# Patient Record
Sex: MALE | Race: WHITE | NOT HISPANIC OR LATINO | Employment: OTHER | URBAN - METROPOLITAN AREA
[De-identification: names, ages, dates, MRNs, and addresses within clinical notes are randomized per-mention and may not be internally consistent; named-entity substitution may affect disease eponyms.]

---

## 2017-03-28 RX ORDER — AMLODIPINE BESYLATE 10 MG/1
TABLET ORAL
Qty: 30 TABLET | Refills: 3 | OUTPATIENT
Start: 2017-03-28

## 2017-03-28 NOTE — TELEPHONE ENCOUNTER
Patient has no showed his last two appts.  Latanya gave pt appt, sent this rx in to last until and scheduled pt an appt for 3/27/17, in which pt no showed.

## 2017-04-24 RX ORDER — AMLODIPINE BESYLATE 10 MG/1
TABLET ORAL
Qty: 30 TABLET | Refills: 3 | OUTPATIENT
Start: 2017-04-24

## 2018-04-17 ENCOUNTER — LAB REQUISITION (OUTPATIENT)
Dept: LAB | Facility: HOSPITAL | Age: 63
End: 2018-04-17

## 2018-04-17 DIAGNOSIS — G56.01 CARPAL TUNNEL SYNDROME OF RIGHT WRIST: ICD-10-CM

## 2018-04-17 LAB — POTASSIUM BLDA-SCNC: 4.35 MMOL/L (ref 3.5–5.3)

## 2018-04-17 PROCEDURE — 84132 ASSAY OF SERUM POTASSIUM: CPT | Performed by: PLASTIC SURGERY

## 2018-06-04 ENCOUNTER — HOSPITAL ENCOUNTER (INPATIENT)
Facility: HOSPITAL | Age: 63
LOS: 1 days | Discharge: HOME | End: 2018-06-07
Attending: FAMILY MEDICINE | Admitting: HOSPITALIST

## 2018-06-04 DIAGNOSIS — R07.89 OTHER CHEST PAIN: ICD-10-CM

## 2018-06-04 DIAGNOSIS — R07.9 CHEST PAIN, UNSPECIFIED TYPE: Primary | ICD-10-CM

## 2018-06-04 PROCEDURE — 99285 EMERGENCY DEPT VISIT HI MDM: CPT

## 2018-06-04 PROCEDURE — 93010 ELECTROCARDIOGRAM REPORT: CPT | Performed by: INTERNAL MEDICINE

## 2018-06-04 PROCEDURE — 93005 ELECTROCARDIOGRAM TRACING: CPT | Performed by: FAMILY MEDICINE

## 2018-06-05 ENCOUNTER — APPOINTMENT (OUTPATIENT)
Dept: NUCLEAR MEDICINE | Facility: HOSPITAL | Age: 63
End: 2018-06-05

## 2018-06-05 ENCOUNTER — APPOINTMENT (OUTPATIENT)
Dept: CARDIOLOGY | Facility: HOSPITAL | Age: 63
End: 2018-06-05
Attending: INTERNAL MEDICINE

## 2018-06-05 ENCOUNTER — EPISODE CHANGES (OUTPATIENT)
Dept: CASE MANAGEMENT | Facility: OTHER | Age: 63
End: 2018-06-05

## 2018-06-05 ENCOUNTER — APPOINTMENT (OUTPATIENT)
Dept: GENERAL RADIOLOGY | Facility: HOSPITAL | Age: 63
End: 2018-06-05

## 2018-06-05 ENCOUNTER — APPOINTMENT (OUTPATIENT)
Dept: CT IMAGING | Facility: HOSPITAL | Age: 63
End: 2018-06-05

## 2018-06-05 ENCOUNTER — APPOINTMENT (OUTPATIENT)
Dept: CARDIOLOGY | Facility: HOSPITAL | Age: 63
End: 2018-06-05

## 2018-06-05 PROBLEM — R07.9 CHEST PAIN: Status: ACTIVE | Noted: 2018-06-05

## 2018-06-05 LAB
ALBUMIN SERPL-MCNC: 4.3 G/DL (ref 3.4–4.8)
ALBUMIN/GLOB SERPL: 1.6 G/DL (ref 1.5–2.5)
ALP SERPL-CCNC: 73 U/L (ref 40–129)
ALT SERPL W P-5'-P-CCNC: 29 U/L (ref 10–44)
ANION GAP SERPL CALCULATED.3IONS-SCNC: 8.1 MMOL/L (ref 3.6–11.2)
AST SERPL-CCNC: 31 U/L (ref 10–34)
BASOPHILS # BLD AUTO: 0.02 10*3/MM3 (ref 0–0.3)
BASOPHILS NFR BLD AUTO: 0.3 % (ref 0–2)
BH CV ECHO MEAS - % IVS THICK: 7.4 %
BH CV ECHO MEAS - % LVPW THICK: 37.1 %
BH CV ECHO MEAS - ACS: 2.3 CM
BH CV ECHO MEAS - AO MAX PG: 13.8 MMHG
BH CV ECHO MEAS - AO MEAN PG: 7.2 MMHG
BH CV ECHO MEAS - AO ROOT AREA (BSA CORRECTED): 1.5
BH CV ECHO MEAS - AO ROOT AREA: 10.5 CM^2
BH CV ECHO MEAS - AO ROOT DIAM: 3.6 CM
BH CV ECHO MEAS - AO V2 MAX: 185.5 CM/SEC
BH CV ECHO MEAS - AO V2 MEAN: 122.9 CM/SEC
BH CV ECHO MEAS - AO V2 VTI: 39.8 CM
BH CV ECHO MEAS - BSA(HAYCOCK): 2.6 M^2
BH CV ECHO MEAS - BSA: 2.5 M^2
BH CV ECHO MEAS - BZI_BMI: 35.3 KILOGRAMS/M^2
BH CV ECHO MEAS - BZI_METRIC_HEIGHT: 188 CM
BH CV ECHO MEAS - BZI_METRIC_WEIGHT: 124.7 KG
BH CV ECHO MEAS - CONTRAST EF 4CH: 51.9 ML/M^2
BH CV ECHO MEAS - EDV(CUBED): 211.2 ML
BH CV ECHO MEAS - EDV(MOD-SP4): 106 ML
BH CV ECHO MEAS - EDV(TEICH): 177 ML
BH CV ECHO MEAS - EF(CUBED): 58 %
BH CV ECHO MEAS - EF(MOD-SP4): 51.9 %
BH CV ECHO MEAS - EF(TEICH): 48.8 %
BH CV ECHO MEAS - ESV(CUBED): 88.7 ML
BH CV ECHO MEAS - ESV(MOD-SP4): 51 ML
BH CV ECHO MEAS - ESV(TEICH): 90.5 ML
BH CV ECHO MEAS - FS: 25.1 %
BH CV ECHO MEAS - IVS/LVPW: 1.2
BH CV ECHO MEAS - IVSD: 1.7 CM
BH CV ECHO MEAS - IVSS: 1.8 CM
BH CV ECHO MEAS - LA DIMENSION: 2.9 CM
BH CV ECHO MEAS - LA/AO: 0.79
BH CV ECHO MEAS - LV DIASTOLIC VOL/BSA (35-75): 42.6 ML/M^2
BH CV ECHO MEAS - LV MASS(C)D: 440.1 GRAMS
BH CV ECHO MEAS - LV MASS(C)DI: 176.9 GRAMS/M^2
BH CV ECHO MEAS - LV MASS(C)S: 384 GRAMS
BH CV ECHO MEAS - LV MASS(C)SI: 154.3 GRAMS/M^2
BH CV ECHO MEAS - LV SYSTOLIC VOL/BSA (12-30): 20.5 ML/M^2
BH CV ECHO MEAS - LVIDD: 6 CM
BH CV ECHO MEAS - LVIDS: 4.5 CM
BH CV ECHO MEAS - LVLD AP4: 8.5 CM
BH CV ECHO MEAS - LVLS AP4: 7.7 CM
BH CV ECHO MEAS - LVOT AREA (M): 6.6 CM^2
BH CV ECHO MEAS - LVOT AREA: 6.6 CM^2
BH CV ECHO MEAS - LVOT DIAM: 2.9 CM
BH CV ECHO MEAS - LVPWD: 1.4 CM
BH CV ECHO MEAS - LVPWS: 1.9 CM
BH CV ECHO MEAS - MV A MAX VEL: 89.8 CM/SEC
BH CV ECHO MEAS - MV E MAX VEL: 99.7 CM/SEC
BH CV ECHO MEAS - MV E/A: 1.1
BH CV ECHO MEAS - PA ACC SLOPE: 1131 CM/SEC^2
BH CV ECHO MEAS - PA ACC TIME: 0.11 SEC
BH CV ECHO MEAS - PA PR(ACCEL): 31.5 MMHG
BH CV ECHO MEAS - SI(AO): 167.4 ML/M^2
BH CV ECHO MEAS - SI(CUBED): 49.2 ML/M^2
BH CV ECHO MEAS - SI(MOD-SP4): 22.1 ML/M^2
BH CV ECHO MEAS - SI(TEICH): 34.7 ML/M^2
BH CV ECHO MEAS - SV(AO): 416.4 ML
BH CV ECHO MEAS - SV(CUBED): 122.5 ML
BH CV ECHO MEAS - SV(MOD-SP4): 55 ML
BH CV ECHO MEAS - SV(TEICH): 86.4 ML
BH CV NUCLEAR PRIOR STUDY: 3
BH CV STRESS BP STAGE 1: NORMAL
BH CV STRESS BP STAGE 2: NORMAL
BH CV STRESS COMMENTS STAGE 1: NORMAL
BH CV STRESS COMMENTS STAGE 2: NORMAL
BH CV STRESS DOSE REGADENOSON STAGE 1: 0.4
BH CV STRESS DURATION MIN STAGE 1: 0
BH CV STRESS DURATION MIN STAGE 2: 4
BH CV STRESS DURATION SEC STAGE 1: 10
BH CV STRESS DURATION SEC STAGE 2: 0
BH CV STRESS HR STAGE 1: 88
BH CV STRESS HR STAGE 2: 73
BH CV STRESS PROTOCOL 1: NORMAL
BH CV STRESS RECOVERY BP: NORMAL MMHG
BH CV STRESS RECOVERY HR: 73 BPM
BH CV STRESS STAGE 1: 1
BH CV STRESS STAGE 2: 2
BILIRUB SERPL-MCNC: 0.7 MG/DL (ref 0.2–1.8)
BILIRUB UR QL STRIP: NEGATIVE
BUN BLD-MCNC: 18 MG/DL (ref 7–21)
BUN/CREAT SERPL: 20.5 (ref 7–25)
CALCIUM SPEC-SCNC: 10.1 MG/DL (ref 7.7–10)
CHLORIDE SERPL-SCNC: 104 MMOL/L (ref 99–112)
CHOLEST SERPL-MCNC: 148 MG/DL (ref 0–200)
CK MB SERPL-CCNC: 2.19 NG/ML (ref 0–5)
CK MB SERPL-CCNC: 2.59 NG/ML (ref 0–5)
CK MB SERPL-CCNC: 3.12 NG/ML (ref 0–5)
CK MB SERPL-CCNC: 3.3 NG/ML (ref 0–5)
CK MB SERPL-RTO: 2 % (ref 0–3)
CK MB SERPL-RTO: 2.2 % (ref 0–3)
CK SERPL-CCNC: 115 U/L (ref 24–204)
CK SERPL-CCNC: 139 U/L (ref 24–204)
CK SERPL-CCNC: 163 U/L (ref 24–204)
CLARITY UR: CLEAR
CO2 SERPL-SCNC: 25.9 MMOL/L (ref 24.3–31.9)
COLOR UR: ABNORMAL
CREAT BLD-MCNC: 0.88 MG/DL (ref 0.43–1.29)
D DIMER PPP FEU-MCNC: <0.27 MCGFEU/ML (ref 0–0.5)
DEPRECATED RDW RBC AUTO: 48.4 FL (ref 37–54)
EOSINOPHIL # BLD AUTO: 0.09 10*3/MM3 (ref 0–0.7)
EOSINOPHIL NFR BLD AUTO: 1.3 % (ref 0–7)
ERYTHROCYTE [DISTWIDTH] IN BLOOD BY AUTOMATED COUNT: 13.9 % (ref 11.5–14.5)
FOLATE SERPL-MCNC: 13.07 NG/ML (ref 5.4–20)
GFR SERPL CREATININE-BSD FRML MDRD: 87 ML/MIN/1.73
GLOBULIN UR ELPH-MCNC: 2.7 GM/DL
GLUCOSE BLD-MCNC: 203 MG/DL (ref 70–110)
GLUCOSE BLDC GLUCOMTR-MCNC: 143 MG/DL (ref 70–130)
GLUCOSE BLDC GLUCOMTR-MCNC: 148 MG/DL (ref 70–130)
GLUCOSE BLDC GLUCOMTR-MCNC: 187 MG/DL (ref 70–130)
GLUCOSE UR STRIP-MCNC: ABNORMAL MG/DL
HBA1C MFR BLD: 8.4 % (ref 4.5–5.7)
HCT VFR BLD AUTO: 44.9 % (ref 42–52)
HDLC SERPL-MCNC: 28 MG/DL (ref 60–100)
HGB BLD-MCNC: 15.5 G/DL (ref 14–18)
HGB UR QL STRIP.AUTO: NEGATIVE
IMM GRANULOCYTES # BLD: 0.02 10*3/MM3 (ref 0–0.03)
IMM GRANULOCYTES NFR BLD: 0.3 % (ref 0–0.5)
KETONES UR QL STRIP: ABNORMAL
LDLC SERPL CALC-MCNC: ABNORMAL MG/DL (ref 0–100)
LDLC/HDLC SERPL: ABNORMAL {RATIO}
LEUKOCYTE ESTERASE UR QL STRIP.AUTO: NEGATIVE
LIPASE SERPL-CCNC: 44 U/L (ref 13–60)
LV EF NUC BP: 50 %
LYMPHOCYTES # BLD AUTO: 3.02 10*3/MM3 (ref 1–3)
LYMPHOCYTES NFR BLD AUTO: 41.9 % (ref 16–46)
MAXIMAL PREDICTED HEART RATE: 155 BPM
MAXIMAL PREDICTED HEART RATE: 155 BPM
MCH RBC QN AUTO: 33.7 PG (ref 27–33)
MCHC RBC AUTO-ENTMCNC: 34.5 G/DL (ref 33–37)
MCV RBC AUTO: 97.6 FL (ref 80–94)
MONOCYTES # BLD AUTO: 0.73 10*3/MM3 (ref 0.1–0.9)
MONOCYTES NFR BLD AUTO: 10.1 % (ref 0–12)
MYOGLOBIN SERPL-MCNC: 64 NG/ML (ref 0–109)
MYOGLOBIN SERPL-MCNC: 71 NG/ML (ref 0–109)
MYOGLOBIN SERPL-MCNC: 75 NG/ML (ref 0–109)
NEUTROPHILS # BLD AUTO: 3.32 10*3/MM3 (ref 1.4–6.5)
NEUTROPHILS NFR BLD AUTO: 46.1 % (ref 40–75)
NITRITE UR QL STRIP: NEGATIVE
OSMOLALITY SERPL CALC.SUM OF ELEC: 283.4 MOSM/KG (ref 273–305)
PERCENT MAX PREDICTED HR: 56.77 %
PH UR STRIP.AUTO: 5.5 [PH] (ref 5–8)
PLATELET # BLD AUTO: 112 10*3/MM3 (ref 130–400)
PMV BLD AUTO: 11.5 FL (ref 6–10)
POTASSIUM BLD-SCNC: 4 MMOL/L (ref 3.5–5.3)
PROT SERPL-MCNC: 7 G/DL (ref 6–8)
PROT UR QL STRIP: NEGATIVE
RBC # BLD AUTO: 4.6 10*6/MM3 (ref 4.7–6.1)
SODIUM BLD-SCNC: 138 MMOL/L (ref 135–153)
SP GR UR STRIP: 1.03 (ref 1–1.03)
STRESS BASELINE BP: NORMAL MMHG
STRESS BASELINE HR: 64 BPM
STRESS PERCENT HR: 67 %
STRESS POST PEAK BP: NORMAL MMHG
STRESS POST PEAK HR: 88 BPM
STRESS TARGET HR: 132 BPM
STRESS TARGET HR: 132 BPM
TRIGL SERPL-MCNC: 432 MG/DL (ref 0–150)
TROPONIN I SERPL-MCNC: 0.05 NG/ML
TROPONIN I SERPL-MCNC: 0.06 NG/ML
TROPONIN I SERPL-MCNC: 0.07 NG/ML
TROPONIN I SERPL-MCNC: 0.08 NG/ML
TROPONIN I SERPL-MCNC: 0.08 NG/ML
TSH SERPL DL<=0.05 MIU/L-ACNC: 0.74 MIU/ML (ref 0.55–4.78)
UROBILINOGEN UR QL STRIP: ABNORMAL
VIT B12 BLD-MCNC: 447 PG/ML (ref 211–911)
VLDLC SERPL-MCNC: ABNORMAL MG/DL
WBC NRBC COR # BLD: 7.2 10*3/MM3 (ref 4.5–12.5)

## 2018-06-05 PROCEDURE — 93017 CV STRESS TEST TRACING ONLY: CPT

## 2018-06-05 PROCEDURE — 83036 HEMOGLOBIN GLYCOSYLATED A1C: CPT | Performed by: PHYSICIAN ASSISTANT

## 2018-06-05 PROCEDURE — 74176 CT ABD & PELVIS W/O CONTRAST: CPT | Performed by: RADIOLOGY

## 2018-06-05 PROCEDURE — 78452 HT MUSCLE IMAGE SPECT MULT: CPT | Performed by: INTERNAL MEDICINE

## 2018-06-05 PROCEDURE — 0 TECHNETIUM SESTAMIBI: Performed by: HOSPITALIST

## 2018-06-05 PROCEDURE — 93306 TTE W/DOPPLER COMPLETE: CPT | Performed by: INTERNAL MEDICINE

## 2018-06-05 PROCEDURE — 93306 TTE W/DOPPLER COMPLETE: CPT

## 2018-06-05 PROCEDURE — 84484 ASSAY OF TROPONIN QUANT: CPT | Performed by: PHYSICIAN ASSISTANT

## 2018-06-05 PROCEDURE — 84443 ASSAY THYROID STIM HORMONE: CPT | Performed by: PHYSICIAN ASSISTANT

## 2018-06-05 PROCEDURE — 93005 ELECTROCARDIOGRAM TRACING: CPT | Performed by: PHYSICIAN ASSISTANT

## 2018-06-05 PROCEDURE — G0378 HOSPITAL OBSERVATION PER HR: HCPCS

## 2018-06-05 PROCEDURE — 81003 URINALYSIS AUTO W/O SCOPE: CPT | Performed by: PHYSICIAN ASSISTANT

## 2018-06-05 PROCEDURE — 82746 ASSAY OF FOLIC ACID SERUM: CPT | Performed by: PHYSICIAN ASSISTANT

## 2018-06-05 PROCEDURE — 80061 LIPID PANEL: CPT | Performed by: PHYSICIAN ASSISTANT

## 2018-06-05 PROCEDURE — 25010000002 REGADENOSON 0.4 MG/5ML SOLUTION: Performed by: HOSPITALIST

## 2018-06-05 PROCEDURE — 82550 ASSAY OF CK (CPK): CPT | Performed by: PHYSICIAN ASSISTANT

## 2018-06-05 PROCEDURE — 99222 1ST HOSP IP/OBS MODERATE 55: CPT | Performed by: INTERNAL MEDICINE

## 2018-06-05 PROCEDURE — 25010000002 HEPARIN (PORCINE) PER 1000 UNITS: Performed by: INTERNAL MEDICINE

## 2018-06-05 PROCEDURE — 82553 CREATINE MB FRACTION: CPT | Performed by: INTERNAL MEDICINE

## 2018-06-05 PROCEDURE — 83874 ASSAY OF MYOGLOBIN: CPT | Performed by: INTERNAL MEDICINE

## 2018-06-05 PROCEDURE — 71046 X-RAY EXAM CHEST 2 VIEWS: CPT | Performed by: RADIOLOGY

## 2018-06-05 PROCEDURE — 93018 CV STRESS TEST I&R ONLY: CPT | Performed by: INTERNAL MEDICINE

## 2018-06-05 PROCEDURE — 85025 COMPLETE CBC W/AUTO DIFF WBC: CPT | Performed by: PHYSICIAN ASSISTANT

## 2018-06-05 PROCEDURE — 74176 CT ABD & PELVIS W/O CONTRAST: CPT

## 2018-06-05 PROCEDURE — 80053 COMPREHEN METABOLIC PANEL: CPT | Performed by: PHYSICIAN ASSISTANT

## 2018-06-05 PROCEDURE — 99223 1ST HOSP IP/OBS HIGH 75: CPT | Performed by: PHYSICIAN ASSISTANT

## 2018-06-05 PROCEDURE — 83690 ASSAY OF LIPASE: CPT | Performed by: PHYSICIAN ASSISTANT

## 2018-06-05 PROCEDURE — 82962 GLUCOSE BLOOD TEST: CPT

## 2018-06-05 PROCEDURE — 84484 ASSAY OF TROPONIN QUANT: CPT | Performed by: INTERNAL MEDICINE

## 2018-06-05 PROCEDURE — A9500 TC99M SESTAMIBI: HCPCS | Performed by: HOSPITALIST

## 2018-06-05 PROCEDURE — 93010 ELECTROCARDIOGRAM REPORT: CPT | Performed by: INTERNAL MEDICINE

## 2018-06-05 PROCEDURE — 94799 UNLISTED PULMONARY SVC/PX: CPT

## 2018-06-05 PROCEDURE — 82607 VITAMIN B-12: CPT | Performed by: PHYSICIAN ASSISTANT

## 2018-06-05 PROCEDURE — 82553 CREATINE MB FRACTION: CPT | Performed by: PHYSICIAN ASSISTANT

## 2018-06-05 PROCEDURE — 85379 FIBRIN DEGRADATION QUANT: CPT | Performed by: HOSPITALIST

## 2018-06-05 PROCEDURE — 78452 HT MUSCLE IMAGE SPECT MULT: CPT

## 2018-06-05 PROCEDURE — 82550 ASSAY OF CK (CPK): CPT | Performed by: INTERNAL MEDICINE

## 2018-06-05 PROCEDURE — 71046 X-RAY EXAM CHEST 2 VIEWS: CPT

## 2018-06-05 PROCEDURE — 86677 HELICOBACTER PYLORI ANTIBODY: CPT | Performed by: PHYSICIAN ASSISTANT

## 2018-06-05 RX ORDER — DULOXETIN HYDROCHLORIDE 60 MG/1
60 CAPSULE, DELAYED RELEASE ORAL NIGHTLY PRN
Status: DISCONTINUED | OUTPATIENT
Start: 2018-06-05 | End: 2018-06-07 | Stop reason: HOSPADM

## 2018-06-05 RX ORDER — ALLOPURINOL 300 MG/1
300 TABLET ORAL NIGHTLY
Status: DISCONTINUED | OUTPATIENT
Start: 2018-06-05 | End: 2018-06-07 | Stop reason: HOSPADM

## 2018-06-05 RX ORDER — HEPARIN SODIUM 5000 [USP'U]/ML
5000 INJECTION, SOLUTION INTRAVENOUS; SUBCUTANEOUS EVERY 12 HOURS SCHEDULED
Status: DISCONTINUED | OUTPATIENT
Start: 2018-06-05 | End: 2018-06-07 | Stop reason: HOSPADM

## 2018-06-05 RX ORDER — ASPIRIN 81 MG/1
324 TABLET, CHEWABLE ORAL ONCE
Status: COMPLETED | OUTPATIENT
Start: 2018-06-05 | End: 2018-06-05

## 2018-06-05 RX ORDER — MAGNESIUM HYDROXIDE/ALUMINUM HYDROXICE/SIMETHICONE 120; 1200; 1200 MG/30ML; MG/30ML; MG/30ML
10 SUSPENSION ORAL ONCE
Status: DISCONTINUED | OUTPATIENT
Start: 2018-06-05 | End: 2018-06-05 | Stop reason: CLARIF

## 2018-06-05 RX ORDER — ALLOPURINOL 300 MG/1
300 TABLET ORAL NIGHTLY
COMMUNITY

## 2018-06-05 RX ORDER — SODIUM CHLORIDE 0.9 % (FLUSH) 0.9 %
10 SYRINGE (ML) INJECTION AS NEEDED
Status: DISCONTINUED | OUTPATIENT
Start: 2018-06-05 | End: 2018-06-07 | Stop reason: HOSPADM

## 2018-06-05 RX ORDER — ATORVASTATIN CALCIUM 40 MG/1
40 TABLET, FILM COATED ORAL DAILY
Status: DISCONTINUED | OUTPATIENT
Start: 2018-06-05 | End: 2018-06-07 | Stop reason: HOSPADM

## 2018-06-05 RX ORDER — HYDROCODONE BITARTRATE AND ACETAMINOPHEN 10; 325 MG/1; MG/1
1 TABLET ORAL 3 TIMES DAILY PRN
Status: DISCONTINUED | OUTPATIENT
Start: 2018-06-05 | End: 2018-06-07 | Stop reason: HOSPADM

## 2018-06-05 RX ORDER — LISINOPRIL 10 MG/1
20 TABLET ORAL NIGHTLY
Status: DISCONTINUED | OUTPATIENT
Start: 2018-06-05 | End: 2018-06-07 | Stop reason: HOSPADM

## 2018-06-05 RX ORDER — TAMSULOSIN HYDROCHLORIDE 0.4 MG/1
0.4 CAPSULE ORAL NIGHTLY
Status: DISCONTINUED | OUTPATIENT
Start: 2018-06-05 | End: 2018-06-07 | Stop reason: HOSPADM

## 2018-06-05 RX ORDER — METOPROLOL SUCCINATE 25 MG/1
25 TABLET, EXTENDED RELEASE ORAL NIGHTLY
Status: DISCONTINUED | OUTPATIENT
Start: 2018-06-05 | End: 2018-06-07

## 2018-06-05 RX ORDER — LISINOPRIL AND HYDROCHLOROTHIAZIDE 20; 12.5 MG/1; MG/1
1 TABLET ORAL NIGHTLY
COMMUNITY
End: 2018-10-09 | Stop reason: SDUPTHER

## 2018-06-05 RX ORDER — DULOXETIN HYDROCHLORIDE 60 MG/1
60 CAPSULE, DELAYED RELEASE ORAL NIGHTLY
Status: DISCONTINUED | OUTPATIENT
Start: 2018-06-05 | End: 2018-06-05

## 2018-06-05 RX ORDER — SODIUM CHLORIDE 0.9 % (FLUSH) 0.9 %
1-10 SYRINGE (ML) INJECTION AS NEEDED
Status: DISCONTINUED | OUTPATIENT
Start: 2018-06-05 | End: 2018-06-07 | Stop reason: HOSPADM

## 2018-06-05 RX ORDER — ASPIRIN 81 MG/1
81 TABLET ORAL DAILY
Status: DISCONTINUED | OUTPATIENT
Start: 2018-06-05 | End: 2018-06-07 | Stop reason: HOSPADM

## 2018-06-05 RX ORDER — PHENOBARBITAL, HYOSCYAMINE SULFATE, ATROPINE SULFATE AND SCOPOLAMINE HYDROBROMIDE .0194; .1037; 16.2; .0065 MG/1; MG/1; MG/1; MG/1
2 TABLET ORAL ONCE
Status: COMPLETED | OUTPATIENT
Start: 2018-06-05 | End: 2018-06-05

## 2018-06-05 RX ORDER — IBUPROFEN 600 MG/1
600 TABLET ORAL 2 TIMES DAILY PRN
Status: CANCELLED | OUTPATIENT
Start: 2018-06-05

## 2018-06-05 RX ORDER — NICOTINE POLACRILEX 4 MG
15 LOZENGE BUCCAL
Status: DISCONTINUED | OUTPATIENT
Start: 2018-06-05 | End: 2018-06-07 | Stop reason: HOSPADM

## 2018-06-05 RX ORDER — HYDROCHLOROTHIAZIDE 12.5 MG/1
12.5 CAPSULE, GELATIN COATED ORAL NIGHTLY
Status: DISCONTINUED | OUTPATIENT
Start: 2018-06-05 | End: 2018-06-07 | Stop reason: HOSPADM

## 2018-06-05 RX ORDER — DULOXETIN HYDROCHLORIDE 60 MG/1
60 CAPSULE, DELAYED RELEASE ORAL NIGHTLY PRN
COMMUNITY
End: 2020-07-15 | Stop reason: ALTCHOICE

## 2018-06-05 RX ORDER — PANTOPRAZOLE SODIUM 40 MG/1
40 TABLET, DELAYED RELEASE ORAL
Status: DISCONTINUED | OUTPATIENT
Start: 2018-06-05 | End: 2018-06-07 | Stop reason: HOSPADM

## 2018-06-05 RX ORDER — GLIPIZIDE 5 MG/1
5 TABLET ORAL
Status: CANCELLED | OUTPATIENT
Start: 2018-06-05

## 2018-06-05 RX ORDER — ALUMINA, MAGNESIA, AND SIMETHICONE 2400; 2400; 240 MG/30ML; MG/30ML; MG/30ML
5 SUSPENSION ORAL ONCE
Status: COMPLETED | OUTPATIENT
Start: 2018-06-05 | End: 2018-06-05

## 2018-06-05 RX ORDER — DEXTROSE MONOHYDRATE 25 G/50ML
25 INJECTION, SOLUTION INTRAVENOUS
Status: DISCONTINUED | OUTPATIENT
Start: 2018-06-05 | End: 2018-06-07 | Stop reason: HOSPADM

## 2018-06-05 RX ORDER — CARVEDILOL 6.25 MG/1
6.25 TABLET ORAL EVERY 12 HOURS SCHEDULED
Status: DISCONTINUED | OUTPATIENT
Start: 2018-06-05 | End: 2018-06-07

## 2018-06-05 RX ORDER — ALUMINA, MAGNESIA, AND SIMETHICONE 2400; 2400; 240 MG/30ML; MG/30ML; MG/30ML
15 SUSPENSION ORAL EVERY 6 HOURS PRN
Status: DISCONTINUED | OUTPATIENT
Start: 2018-06-05 | End: 2018-06-07 | Stop reason: HOSPADM

## 2018-06-05 RX ORDER — NITROGLYCERIN 0.4 MG/1
0.4 TABLET SUBLINGUAL
Status: DISCONTINUED | OUTPATIENT
Start: 2018-06-05 | End: 2018-06-07 | Stop reason: HOSPADM

## 2018-06-05 RX ORDER — METOPROLOL SUCCINATE 25 MG/1
25 TABLET, EXTENDED RELEASE ORAL NIGHTLY
COMMUNITY
End: 2018-06-07 | Stop reason: HOSPADM

## 2018-06-05 RX ADMIN — HEPARIN SODIUM 5000 UNITS: 5000 INJECTION, SOLUTION INTRAVENOUS; SUBCUTANEOUS at 08:09

## 2018-06-05 RX ADMIN — ATORVASTATIN CALCIUM 40 MG: 40 TABLET, FILM COATED ORAL at 08:10

## 2018-06-05 RX ADMIN — TECHNETIUM TC 99M SESTAMIBI 1 DOSE: 1 INJECTION INTRAVENOUS at 12:15

## 2018-06-05 RX ADMIN — LISINOPRIL 20 MG: 10 TABLET ORAL at 20:02

## 2018-06-05 RX ADMIN — CARVEDILOL 6.25 MG: 6.25 TABLET, FILM COATED ORAL at 22:25

## 2018-06-05 RX ADMIN — PHENOBARBITAL, HYOSCYAMINE SULFATE, ATROPINE SULFATE, SCOPOLAMINE HYDROBROMIDE 32.4 MG: 16.2; .1037; .0194; .0065 TABLET ORAL at 00:34

## 2018-06-05 RX ADMIN — ASPIRIN 81 MG: 81 TABLET ORAL at 08:10

## 2018-06-05 RX ADMIN — LIDOCAINE HYDROCHLORIDE 5 ML: 20 SOLUTION ORAL; TOPICAL at 00:36

## 2018-06-05 RX ADMIN — TECHNETIUM TC 99M SESTAMIBI 1 DOSE: 1 INJECTION INTRAVENOUS at 13:10

## 2018-06-05 RX ADMIN — ALUMINUM HYDROXIDE, MAGNESIUM HYDROXIDE, AND DIMETHICONE 5 ML: 400; 400; 40 SUSPENSION ORAL at 00:36

## 2018-06-05 RX ADMIN — REGADENOSON 0.4 MG: 0.08 INJECTION, SOLUTION INTRAVENOUS at 14:10

## 2018-06-05 RX ADMIN — ASPIRIN 81 MG: 81 TABLET, CHEWABLE ORAL at 00:35

## 2018-06-05 RX ADMIN — HYDROCHLOROTHIAZIDE 12.5 MG: 12.5 CAPSULE, GELATIN COATED ORAL at 20:02

## 2018-06-05 RX ADMIN — HEPARIN SODIUM 5000 UNITS: 5000 INJECTION, SOLUTION INTRAVENOUS; SUBCUTANEOUS at 20:01

## 2018-06-05 RX ADMIN — NITROGLYCERIN 1 INCH: 20 OINTMENT TOPICAL at 01:35

## 2018-06-05 RX ADMIN — PANTOPRAZOLE SODIUM 40 MG: 40 TABLET, DELAYED RELEASE ORAL at 18:34

## 2018-06-05 RX ADMIN — METOPROLOL SUCCINATE 25 MG: 25 TABLET, FILM COATED, EXTENDED RELEASE ORAL at 20:03

## 2018-06-05 RX ADMIN — HYDROCODONE BITARTRATE AND ACETAMINOPHEN 1 TABLET: 10; 325 TABLET ORAL at 10:49

## 2018-06-05 RX ADMIN — TAMSULOSIN HYDROCHLORIDE 0.4 MG: 0.4 CAPSULE ORAL at 20:03

## 2018-06-05 RX ADMIN — HYDROCODONE BITARTRATE AND ACETAMINOPHEN 1 TABLET: 10; 325 TABLET ORAL at 18:38

## 2018-06-05 RX ADMIN — ALLOPURINOL 300 MG: 300 TABLET ORAL at 20:02

## 2018-06-05 NOTE — PLAN OF CARE
Problem: Patient Care Overview  Goal: Plan of Care Review  Outcome: Ongoing (interventions implemented as appropriate)    Goal: Individualization and Mutuality  Outcome: Ongoing (interventions implemented as appropriate)    Goal: Discharge Needs Assessment  Outcome: Ongoing (interventions implemented as appropriate)    Goal: Interprofessional Rounds/Family Conf  Outcome: Ongoing (interventions implemented as appropriate)      Problem: Cardiac Output Decreased (Adult)  Goal: Identify Related Risk Factors and Signs and Symptoms  Outcome: Ongoing (interventions implemented as appropriate)    Goal: Effective Tissue Perfusion  Outcome: Ongoing (interventions implemented as appropriate)      Problem: Pain, Acute (Adult)  Goal: Identify Related Risk Factors and Signs and Symptoms  Outcome: Ongoing (interventions implemented as appropriate)    Goal: Acceptable Pain Control/Comfort Level  Outcome: Ongoing (interventions implemented as appropriate)

## 2018-06-05 NOTE — PLAN OF CARE
Problem: Patient Care Overview  Goal: Plan of Care Review  Outcome: Ongoing (interventions implemented as appropriate)    Goal: Individualization and Mutuality  Outcome: Ongoing (interventions implemented as appropriate)    Goal: Interprofessional Rounds/Family Conf  Outcome: Ongoing (interventions implemented as appropriate)      Problem: Cardiac Output Decreased (Adult)  Goal: Identify Related Risk Factors and Signs and Symptoms  Outcome: Ongoing (interventions implemented as appropriate)    Goal: Effective Tissue Perfusion  Outcome: Ongoing (interventions implemented as appropriate)      Problem: Pain, Acute (Adult)  Goal: Identify Related Risk Factors and Signs and Symptoms  Outcome: Outcome(s) achieved Date Met: 06/05/18    Goal: Acceptable Pain Control/Comfort Level  Outcome: Ongoing (interventions implemented as appropriate)

## 2018-06-05 NOTE — H&P
"     [facility] HOSPITALIST HISTORY AND PHYSICAL    Patient Identification:  Name:  Adalberto Llamas  Age:  63 y.o.  Sex:  male  :  1955  MRN:  [id number]   Visit Number:  [id number]  Primary Care Physician:  Fish Delaney MD     Subjective     Chief complaint:   Chief Complaint   Patient presents with   • Chest Pain     History of presenting illness:   Patient is a 63 y.o. male with past medical history significant for coronary artery disease, essential hypertension, hyperlipidemia, non-insulin dependent type II diabetes mellitus, fatty liver disease, chronic back pain due to degenerative disc disease, history of gout, and peripheral neuropathy that presented to the [facility] emergency department for evaluation of chest pain. Patient states that onset of symptoms was 3 days ago. Patient states that the pain is located her his right rib cage anteriorly. He states that the pain is moderate in severity, without any radiation. He states that the pain has been constant and he describes it as \"gas like\" pain. He states that he has been belching frequently. He denies any relievers of his symptoms, but states that eating does exacerbate his pain. He states that since time of onset, he has experienced sensation of smothering at times. Patient's wife present at bedside states that since time of onset, patient has not been able to do much but lie in bed due to not feeling well which is not normal for him. He states that he was recently diagnosed with hiatal hernia by his PCP, he has not undergone any further testing/treatment and has not been seen for this hernia. He denies any nausea or vomiting, no change in bowel movements. He denies any fevers or chills. He denies any increase or change in extremity edema or abdominal distension. He denies any urinary symptoms.      Upon arrival to the ED, vitals were temperature 98.1, heart rate 75, RR 16, /85, and oxygen saturation 96% on room air. " Initial troponin 0.078. CMP with glucose 203, otherwise unremarkable. Lipase WNL. CBC with MCV 97.6, otherwise unremarkable. U/A with dark yellow appearance, 2+ glucose, and trace ketones. CXR with no evidence of acute cardiopulmonary disease. While in the ED, patient was given GI cocktail with 5 ml Maalox Max, 5 ML 2% viscous lidocaine, and 32.4 mg Donnatal. Patient was given full dose 324 aspirin as well as 1 inch nitropaste.     Patient has been placed in observation status on the telemetry floor for further evaluation and treatment.   ---------------------------------------------------------------------------------------------------------------------   Review of Systems   Constitutional: Positive for activity change and appetite change. Negative for chills, diaphoresis, fatigue and fever.   HENT: Negative for congestion, nosebleeds, postnasal drip, rhinorrhea, sinus pain, sinus pressure and sneezing.    Eyes: Negative for discharge and visual disturbance.   Respiratory: Positive for shortness of breath. Negative for cough and wheezing.    Cardiovascular: Positive for chest pain. Negative for palpitations and leg swelling.   Gastrointestinal: Negative for abdominal distention, abdominal pain, constipation, diarrhea, nausea and vomiting.   Endocrine: Negative for cold intolerance and heat intolerance.   Genitourinary: Negative for dysuria, flank pain, frequency and urgency.   Musculoskeletal: Positive for back pain. Negative for arthralgias and myalgias.   Skin: Negative for pallor and rash.   Allergic/Immunologic: Negative for environmental allergies and immunocompromised state.   Neurological: Positive for weakness. Negative for dizziness, syncope and light-headedness.   Hematological: Negative for adenopathy. Does not bruise/bleed easily.   Psychiatric/Behavioral: Negative for confusion and decreased concentration. The patient is not nervous/anxious.        ---------------------------------------------------------------------------------------------------------------------   Past Medical History:   Diagnosis Date   • Arthritis    • B12 deficiency    • Diabetes mellitus    • Gout    • Hyperlipidemia    • Hypertension    • Liver cirrhosis    • Low back pain    • Myocardial infarction    • Peripheral neuropathy    • Thrombocytopenia      Past Surgical History:   Procedure Laterality Date   • CARPAL TUNNEL RELEASE Bilateral     Dr. Juan   • CHOLECYSTECTOMY     • TONSILLECTOMY       Family History   Problem Relation Age of Onset   • Heart disease Mother    • Cancer Mother    • Cancer Father      Social History     Social History   • Marital status:      Social History Main Topics   • Smoking status: Never Smoker   • Smokeless tobacco: Never Used   • Alcohol use Yes      Comment: Rare   • Drug use: No     Other Topics Concern   • Not on file     ---------------------------------------------------------------------------------------------------------------------   Allergies:  Patient has no known allergies.  ---------------------------------------------------------------------------------------------------------------------   Prior to Admission Medications     Prescriptions Last Dose Informant Patient Reported? Taking?    allopurinol (ZYLOPRIM) 300 MG tablet 6/4/2018 Self Yes Yes    Take 300 mg by mouth Every Night.    DULoxetine (CYMBALTA) 60 MG capsule 6/4/2018 Self Yes Yes    Take 60 mg by mouth Every Night.    glimepiride (AMARYL) 2 MG tablet 6/4/2018 Self Yes Yes    Take 2 mg by mouth Every Night.    HYDROcodone-acetaminophen (NORCO)  MG per tablet 6/4/2018 CARLOS Yes Yes    Take 1 tablet by mouth 3 (Three) Times a Day As Needed for Mild Pain .    ibuprofen (ADVIL,MOTRIN) 600 MG tablet 6/4/2018 Self Yes Yes    Take 600 mg by mouth 2 (Two) Times a Day As Needed for Mild Pain .    lisinopril-hydrochlorothiazide (PRINZIDE,ZESTORETIC) 20-12.5 MG per tablet  6/4/2018 Self Yes Yes    Take 1 tablet by mouth Every Night.    metFORMIN (GLUCOPHAGE) 1000 MG tablet 6/4/2018 Self Yes Yes    Take 1,000 mg by mouth 2 (Two) Times a Day.    metoprolol succinate XL (TOPROL-XL) 25 MG 24 hr tablet 6/4/2018 Self Yes Yes    Take 25 mg by mouth Every Night.    simvastatin (ZOCOR) 80 MG tablet 6/4/2018 Self Yes Yes    Take 80 mg by mouth every night.    tamsulosin (FLOMAX) 0.4 MG capsule 24 hr capsule 6/4/2018 Self Yes Yes    Take 1 capsule by mouth every night.        ---------------------------------------------------------------------------------------------------------------------    Objective     Hospital Scheduled Meds:    allopurinol 300 mg Oral Nightly   aspirin 81 mg Oral Daily   atorvastatin 40 mg Oral Daily   heparin (porcine) 5,000 Units Subcutaneous Q12H   lisinopril 20 mg Oral Nightly   And      Hydrochlorothiazide Oral 12.5 mg Oral Nightly   insulin aspart 0-7 Units Subcutaneous 4x Daily AC & at Bedtime   metoprolol succinate XL 25 mg Oral Nightly   pantoprazole 40 mg Oral BID AC   tamsulosin 0.4 mg Oral Nightly        ---------------------------------------------------------------------------------------------------------------------   Vital Signs:  Temp:  [97.6 °F (36.4 °C)-98.5 °F (36.9 °C)] 97.6 °F (36.4 °C)  Heart Rate:  [58-78] 75  Resp:  [16-20] 20  BP: (115-170)/(48-86) 158/86  Mean Arterial Pressure (Non-Invasive) for the past 24 hrs (Last 3 readings):   Noninvasive MAP (mmHg)   06/05/18 1051 123   06/05/18 0700 81   06/05/18 0414 114     SpO2 Percentage    06/05/18 0441 06/05/18 0700 06/05/18 1051   SpO2: 95% 95% 96%     SpO2:  [92 %-98 %] 96 %  on   ;   Device (Oxygen Therapy): room air    Body mass index is 35.37 kg/m².  Wt Readings from Last 3 Encounters:   06/05/18 125 kg (275 lb 8 oz)   12/13/16 125 kg (276 lb)   11/15/16 125 kg (275 lb)     ---------------------------------------------------------------------------------------------------------------------    Physical Exam:  Constitutional:  Well-developed and well-nourished.  No respiratory distress. Hard of hearing.      HENT:  Head: Normocephalic and atraumatic.  Mouth:  Moist mucous membranes.    Eyes:  Conjunctivae and EOM are normal.  Pupils are equal, round, and reactive to light.  No scleral icterus.  Neck:  Neck supple.  No JVD present.    Cardiovascular:  Normal rate, regular rhythm and normal heart sounds with no murmur.  Pulmonary/Chest:  No respiratory distress, no wheezes, no crackles, with normal breath sounds and good air movement. Slightly diminished due to body habitus.   Abdominal:  Protuberant.  Bowel sounds are normal.  No distension and no tenderness.    Musculoskeletal:  No tenderness, and no deformity.  No red or swollen joints anywhere.    Neurological:  Alert and oriented to person, place, and time.  No cranial nerve deficit.  No tongue deviation.  No facial droop.  No slurred speech.   Skin:  Skin is warm and dry.  No rash noted.  No pallor.   Psychiatric:  Normal mood and affect.  Behavior is normal.  Judgment and thought content normal.   Peripheral vascular:  Trace/1+ edema bilateral lower extremities and strong pulses on all 4 extremities. Cap refill < 3 seconds.  Genitourinary: No pretty catheter in place, making urine.   ---------------------------------------------------------------------------------------------------------------------  EKG:        Telemetry:    Sinus 70s     I have personally reviewed the EKG/Telemetry strip  ---------------------------------------------------------------------------------------------------------------------     Results from last 7 days  Lab Units 06/05/18  0450 06/05/18  0155 06/05/18  0020   CK TOTAL U/L 139 163  --    CKMB ng/mL 3.12 3.30  --    CK MB INDEX % 2.2 2.0  --    TROPONIN I ng/mL 0.080* 0.074* 0.078*   MYOGLOBIN ng/mL 64.0  --   --              Results from last 7 days  Lab Units 06/05/18  0020   WBC 10*3/mm3 7.20   HEMOGLOBIN g/dL 15.5    HEMATOCRIT % 44.9   MCV fL 97.6*   MCHC g/dL 34.5   PLATELETS 10*3/mm3 112*       Results from last 7 days  Lab Units 06/05/18  0020   SODIUM mmol/L 138   POTASSIUM mmol/L 4.0   CHLORIDE mmol/L 104   CO2 mmol/L 25.9   BUN mg/dL 18   CREATININE mg/dL 0.88   EGFR IF NONAFRICN AM mL/min/1.73 87   CALCIUM mg/dL 10.1*   GLUCOSE mg/dL 203*   ALBUMIN g/dL 4.30   BILIRUBIN mg/dL 0.7   ALK PHOS U/L 73   AST (SGOT) U/L 31   ALT (SGPT) U/L 29   Estimated Creatinine Clearance: 117.5 mL/min (by C-G formula based on SCr of 0.88 mg/dL).  No results found for: AMMONIA    Hemoglobin A1C   Date/Time Value Ref Range Status   06/05/2018 0020 8.40 (H) 4.50 - 5.70 % Final     Glucose   Date/Time Value Ref Range Status   06/05/2018 1103 187 (H) 70 - 130 mg/dL Final     No results found for: TSH, FREET4    Pain Management Panel     There is no flowsheet data to display.            I have personally reviewed the above laboratory results.   ---------------------------------------------------------------------------------------------------------------------  Imaging Results (last 7 days)     Procedure Component Value Units Date/Time    XR Chest 2 View [[id number]] Collected:  06/05/18 0914     Updated:  06/05/18 0916    Narrative:       FINDINGS:   Lungs are aerated.   Heart size, mediastinum, and pulmonary vascularity are stable from  previous.   No pneumothorax.   No effusions.   Stable appearance of the bony structures.    Impression:       Stable chest. No acute cardiopulmonary findings identified.     This report was finalized on 6/5/2018 9:14 AM by Dr. Alexis Aleman MD.      I have personally reviewed the above radiology results.     Left Heart Cath Report 4/29/2016      ---------------------------------------------------------------------------------------------------------------------    Assessment & Plan      -Chest pain, rule out MI with possible GI artifact   -Reported history of recently diagnosed hiatal hernia   -Indeterminate  troponin elevation in setting of known coronary artery disease   -Essential hypertension   -Hyperlipidemia   -Non insulin dependent type II diabetes mellitus with hyperglycemia present on admission   -Diabetic peripheral neuropathy   -History of gout   -Degenerative disc disease with chronic low back pain   -History of fatty liver infiltration   -Mild macrocytosis without anemia   -Obesity, BMI 35.37 kg/m²    Patient has been placed in observation status on the telemetry floor for further evaluation and treatment. So far cardiac isoenzymes with flat trend, peak at 0.080. Continue to trend for a total of 3. Transthoracic echocardiogram ordered to evaluate LVEF and cardiac wall motion. Will order for pharmacological stress test. Patient did have LHC in 2016, which revealed mild atherosclerotic disease not requiring stenting and was treated medically and with risk factor stratification. Will consult cardiology, input/assistance is much appreciated. Will obtain lipid panel. Continue patient on home aspirin, statin, beta blocker, and ACE-I with appropriate holding parameters. Patient does have significant cardiac risk factors to include male sex, age, HTN, HLD, obesity, and DM.     However, patient does not have typical symptoms. His symptoms do sound more GI related in nature. Patient states that Dr. Delaney, his PCP, recently diagnosed with hiatal hernia based on physical exam. Patient states that he is not on any PPI and has not seen anyone for his hernia. I will obtain CT of the abdomen to evaluate further and rule out any incarceration and to see degree of hiatal hernia. Will start patient on BID PPI with PO Protonix. Maalox MAX available PRN. Will also obtain H. Pylori IgM. Further care pending laboratory/radiological results.     Patient is a known diabetic, non insulin dependent. Will obtain HgbA1C to evaluate glycemic control. Will hold home oral DM medications to prevent hypoglycemia while patient NPO for  radiological studies. Hypoglycemia protocol in place should the patient become hypoglycemic. Will closely monitor blood glucose levels with accuchecks QAC and QHS. Sliding scale insulin available PRN for now, titrate dosage as necessary. Will also obtain baseline TSH.     Mild macrocytosis noted without anemia. Will obtain vitamin B12 and folate levels, plan to supplement is patient is found to be deficient.     Continue to closely monitor electrolytes and replace per protocol as necessary. Will review patient's home medications once reconciled per pharmacy and resume as appropriate. Will repeat labs in AM and continue to closely monitor the patient on telemetry.       DVT Prophylaxis: SQ Heparin   GI Prophylaxis: PO Protonix BID     The patient is considered to be a high risk patient due to: Chest pain, DM, HTN, HLD, obesity, troponin elevation    I have discussed the patient's assessment and plan with the patient and patient's wife present at bedside.     JOSE Martell  06/05/18  11:34 AM  ---------------------------------------------------------------------------------------------------------------------

## 2018-06-05 NOTE — PLAN OF CARE
Problem: Patient Care Overview  Goal: Plan of Care Review  Outcome: Ongoing (interventions implemented as appropriate)    Goal: Individualization and Mutuality  Outcome: Ongoing (interventions implemented as appropriate)    Goal: Discharge Needs Assessment  Outcome: Ongoing (interventions implemented as appropriate)    Goal: Interprofessional Rounds/Family Conf  Outcome: Ongoing (interventions implemented as appropriate)      Problem: Cardiac Output Decreased (Adult)  Goal: Identify Related Risk Factors and Signs and Symptoms  Outcome: Ongoing (interventions implemented as appropriate)    Goal: Effective Tissue Perfusion  Outcome: Ongoing (interventions implemented as appropriate)      Problem: Pain, Acute (Adult)  Goal: Acceptable Pain Control/Comfort Level  Outcome: Ongoing (interventions implemented as appropriate)

## 2018-06-05 NOTE — ED PROVIDER NOTES
Subjective     History provided by:  Patient   used: No    Chest Pain   Pain location:  R chest  Pain quality: sharp    Pain radiates to:  Does not radiate  Pain severity:  Moderate  Onset quality:  Sudden  Duration:  3 days  Timing:  Constant  Progression:  Worsening  Chronicity:  New  Relieved by:  Nothing  Worsened by:  Nothing  Ineffective treatments:  None tried  Risk factors: coronary artery disease, diabetes mellitus, high cholesterol and hypertension        Review of Systems   Constitutional: Negative.    HENT: Negative.    Eyes: Negative.    Respiratory: Negative.    Cardiovascular: Positive for chest pain.   Gastrointestinal: Negative.    Endocrine: Negative.    Genitourinary: Negative.    Allergic/Immunologic: Negative.    Neurological: Negative.    All other systems reviewed and are negative.      Past Medical History:   Diagnosis Date   • Arthritis    • B12 deficiency    • Diabetes mellitus    • Gout    • Hyperlipidemia    • Hypertension    • Liver cirrhosis    • Low back pain    • Myocardial infarction    • Peripheral neuropathy    • Thrombocytopenia        No Known Allergies    Past Surgical History:   Procedure Laterality Date   • CARPAL TUNNEL RELEASE Bilateral     Dr. Juan   • CHOLECYSTECTOMY     • TONSILLECTOMY         Family History   Problem Relation Age of Onset   • Heart disease Mother    • Cancer Mother    • Cancer Father        Social History     Social History   • Marital status:      Social History Main Topics   • Smoking status: Never Smoker   • Alcohol use Yes      Comment: Rare   • Drug use: No     Other Topics Concern   • Not on file           Objective   Physical Exam   Constitutional: He is oriented to person, place, and time. He appears well-developed and well-nourished.   HENT:   Head: Normocephalic and atraumatic.   Right Ear: External ear normal.   Left Ear: External ear normal.   Nose: Nose normal.   Mouth/Throat: Oropharynx is clear and moist.    Eyes: Conjunctivae and EOM are normal. Pupils are equal, round, and reactive to light.   Neck: Normal range of motion. Neck supple.   Cardiovascular: Normal rate, regular rhythm, normal heart sounds and intact distal pulses.    Pulmonary/Chest: Effort normal and breath sounds normal.   Abdominal: Soft. Bowel sounds are normal.   Musculoskeletal: Normal range of motion.   Neurological: He is alert and oriented to person, place, and time.   Skin: Skin is warm and dry.   Psychiatric: He has a normal mood and affect. His behavior is normal. Judgment and thought content normal.   Nursing note and vitals reviewed.      Procedures           ED Course  ED Course as of Jun 05 0307   Tue Jun 05, 2018   [id number] Reviewed by Dr. Spangler, rate 75, NSR, no ischemia  ECG 12 Lead [ML]   0300 Martin will admit to tele   [ML]      ED Course User Index  [ML] JOSE Santiago                  [facility]      Final diagnoses:   Chest pain, unspecified type            JOSE Santiago  06/05/18 0307

## 2018-06-06 PROBLEM — R07.89 OTHER CHEST PAIN: Status: ACTIVE | Noted: 2018-06-04

## 2018-06-06 LAB
ACT BLD: 279 SECONDS (ref 82–152)
ALBUMIN SERPL-MCNC: 3.8 G/DL (ref 3.4–4.8)
ALBUMIN/GLOB SERPL: 1.5 G/DL (ref 1.5–2.5)
ALP SERPL-CCNC: 66 U/L (ref 40–129)
ALT SERPL W P-5'-P-CCNC: 29 U/L (ref 10–44)
ANION GAP SERPL CALCULATED.3IONS-SCNC: 6.4 MMOL/L (ref 3.6–11.2)
AST SERPL-CCNC: 23 U/L (ref 10–34)
BASOPHILS # BLD AUTO: 0.01 10*3/MM3 (ref 0–0.3)
BASOPHILS NFR BLD AUTO: 0.2 % (ref 0–2)
BILIRUB SERPL-MCNC: 1.2 MG/DL (ref 0.2–1.8)
BUN BLD-MCNC: 12 MG/DL (ref 7–21)
BUN/CREAT SERPL: 16.2 (ref 7–25)
CALCIUM SPEC-SCNC: 9.4 MG/DL (ref 7.7–10)
CHLORIDE SERPL-SCNC: 107 MMOL/L (ref 99–112)
CO2 SERPL-SCNC: 22.6 MMOL/L (ref 24.3–31.9)
CREAT BLD-MCNC: 0.74 MG/DL (ref 0.43–1.29)
DEPRECATED RDW RBC AUTO: 49 FL (ref 37–54)
EOSINOPHIL # BLD AUTO: 0.11 10*3/MM3 (ref 0–0.7)
EOSINOPHIL NFR BLD AUTO: 1.7 % (ref 0–7)
ERYTHROCYTE [DISTWIDTH] IN BLOOD BY AUTOMATED COUNT: 13.9 % (ref 11.5–14.5)
GFR SERPL CREATININE-BSD FRML MDRD: 106 ML/MIN/1.73
GLOBULIN UR ELPH-MCNC: 2.6 GM/DL
GLUCOSE BLD-MCNC: 152 MG/DL (ref 70–110)
GLUCOSE BLDC GLUCOMTR-MCNC: 176 MG/DL (ref 70–130)
GLUCOSE BLDC GLUCOMTR-MCNC: 178 MG/DL (ref 70–130)
GLUCOSE BLDC GLUCOMTR-MCNC: 206 MG/DL (ref 70–130)
GLUCOSE BLDC GLUCOMTR-MCNC: 232 MG/DL (ref 70–130)
HCT VFR BLD AUTO: 44 % (ref 42–52)
HGB BLD-MCNC: 14.9 G/DL (ref 14–18)
IMM GRANULOCYTES # BLD: 0.02 10*3/MM3 (ref 0–0.03)
IMM GRANULOCYTES NFR BLD: 0.3 % (ref 0–0.5)
LYMPHOCYTES # BLD AUTO: 2.68 10*3/MM3 (ref 1–3)
LYMPHOCYTES NFR BLD AUTO: 42 % (ref 16–46)
MAGNESIUM SERPL-MCNC: 2.2 MG/DL (ref 1.7–2.6)
MCH RBC QN AUTO: 33.2 PG (ref 27–33)
MCHC RBC AUTO-ENTMCNC: 33.9 G/DL (ref 33–37)
MCV RBC AUTO: 98 FL (ref 80–94)
MONOCYTES # BLD AUTO: 0.69 10*3/MM3 (ref 0.1–0.9)
MONOCYTES NFR BLD AUTO: 10.8 % (ref 0–12)
NEUTROPHILS # BLD AUTO: 2.87 10*3/MM3 (ref 1.4–6.5)
NEUTROPHILS NFR BLD AUTO: 45 % (ref 40–75)
OSMOLALITY SERPL CALC.SUM OF ELEC: 274.7 MOSM/KG (ref 273–305)
PHOSPHATE SERPL-MCNC: 3.6 MG/DL (ref 2.7–4.5)
PLATELET # BLD AUTO: 118 10*3/MM3 (ref 130–400)
PMV BLD AUTO: 11.4 FL (ref 6–10)
POTASSIUM BLD-SCNC: 3.6 MMOL/L (ref 3.5–5.3)
PROT SERPL-MCNC: 6.4 G/DL (ref 6–8)
RBC # BLD AUTO: 4.49 10*6/MM3 (ref 4.7–6.1)
SODIUM BLD-SCNC: 136 MMOL/L (ref 135–153)
WBC NRBC COR # BLD: 6.38 10*3/MM3 (ref 4.5–12.5)

## 2018-06-06 PROCEDURE — 4A023N7 MEASUREMENT OF CARDIAC SAMPLING AND PRESSURE, LEFT HEART, PERCUTANEOUS APPROACH: ICD-10-PCS | Performed by: INTERNAL MEDICINE

## 2018-06-06 PROCEDURE — C1725 CATH, TRANSLUMIN NON-LASER: HCPCS | Performed by: INTERNAL MEDICINE

## 2018-06-06 PROCEDURE — 63710000001 INSULIN ASPART PER 5 UNITS: Performed by: PHYSICIAN ASSISTANT

## 2018-06-06 PROCEDURE — B2111ZZ FLUOROSCOPY OF MULTIPLE CORONARY ARTERIES USING LOW OSMOLAR CONTRAST: ICD-10-PCS | Performed by: INTERNAL MEDICINE

## 2018-06-06 PROCEDURE — C1887 CATHETER, GUIDING: HCPCS | Performed by: INTERNAL MEDICINE

## 2018-06-06 PROCEDURE — 93454 CORONARY ARTERY ANGIO S&I: CPT | Performed by: INTERNAL MEDICINE

## 2018-06-06 PROCEDURE — C1769 GUIDE WIRE: HCPCS | Performed by: INTERNAL MEDICINE

## 2018-06-06 PROCEDURE — 25010000002 HEPARIN (PORCINE) PER 1000 UNITS: Performed by: INTERNAL MEDICINE

## 2018-06-06 PROCEDURE — 84100 ASSAY OF PHOSPHORUS: CPT | Performed by: PHYSICIAN ASSISTANT

## 2018-06-06 PROCEDURE — 82962 GLUCOSE BLOOD TEST: CPT

## 2018-06-06 PROCEDURE — 25010000002 FENTANYL CITRATE (PF) 100 MCG/2ML SOLUTION: Performed by: INTERNAL MEDICINE

## 2018-06-06 PROCEDURE — 83735 ASSAY OF MAGNESIUM: CPT | Performed by: PHYSICIAN ASSISTANT

## 2018-06-06 PROCEDURE — 25010000002 MIDAZOLAM PER 1 MG: Performed by: INTERNAL MEDICINE

## 2018-06-06 PROCEDURE — C9600 PERC DRUG-EL COR STENT SING: HCPCS | Performed by: INTERNAL MEDICINE

## 2018-06-06 PROCEDURE — 25010000002 EPTIFIBATIDE PER 5 MG

## 2018-06-06 PROCEDURE — B2151ZZ FLUOROSCOPY OF LEFT HEART USING LOW OSMOLAR CONTRAST: ICD-10-PCS | Performed by: INTERNAL MEDICINE

## 2018-06-06 PROCEDURE — 99232 SBSQ HOSP IP/OBS MODERATE 35: CPT | Performed by: HOSPITALIST

## 2018-06-06 PROCEDURE — 027034Z DILATION OF CORONARY ARTERY, ONE ARTERY WITH DRUG-ELUTING INTRALUMINAL DEVICE, PERCUTANEOUS APPROACH: ICD-10-PCS | Performed by: INTERNAL MEDICINE

## 2018-06-06 PROCEDURE — C1894 INTRO/SHEATH, NON-LASER: HCPCS | Performed by: INTERNAL MEDICINE

## 2018-06-06 PROCEDURE — 99024 POSTOP FOLLOW-UP VISIT: CPT | Performed by: INTERNAL MEDICINE

## 2018-06-06 PROCEDURE — 0 IOPAMIDOL PER 1 ML: Performed by: INTERNAL MEDICINE

## 2018-06-06 PROCEDURE — 85025 COMPLETE CBC W/AUTO DIFF WBC: CPT | Performed by: PHYSICIAN ASSISTANT

## 2018-06-06 PROCEDURE — C1874 STENT, COATED/COV W/DEL SYS: HCPCS | Performed by: INTERNAL MEDICINE

## 2018-06-06 PROCEDURE — 94799 UNLISTED PULMONARY SVC/PX: CPT

## 2018-06-06 PROCEDURE — 92928 PRQ TCAT PLMT NTRAC ST 1 LES: CPT | Performed by: INTERNAL MEDICINE

## 2018-06-06 PROCEDURE — 85347 COAGULATION TIME ACTIVATED: CPT

## 2018-06-06 PROCEDURE — 80053 COMPREHEN METABOLIC PANEL: CPT | Performed by: PHYSICIAN ASSISTANT

## 2018-06-06 DEVICE — XIENCE ALPINE EVEROLIMUS ELUTING CORONARY STENT SYSTEM 2.50 MM X 15 MM / RAPID-EXCHANGE
Type: IMPLANTABLE DEVICE | Status: FUNCTIONAL
Brand: XIENCE ALPINE

## 2018-06-06 RX ORDER — SODIUM CHLORIDE 9 MG/ML
1 INJECTION, SOLUTION INTRAVENOUS CONTINUOUS
Status: ACTIVE | OUTPATIENT
Start: 2018-06-06 | End: 2018-06-06

## 2018-06-06 RX ORDER — SODIUM CHLORIDE 9 MG/ML
INJECTION, SOLUTION INTRAVENOUS CONTINUOUS PRN
Status: COMPLETED | OUTPATIENT
Start: 2018-06-06 | End: 2018-06-06

## 2018-06-06 RX ORDER — LIDOCAINE HYDROCHLORIDE 20 MG/ML
INJECTION, SOLUTION INFILTRATION; PERINEURAL AS NEEDED
Status: DISCONTINUED | OUTPATIENT
Start: 2018-06-06 | End: 2018-06-06 | Stop reason: HOSPADM

## 2018-06-06 RX ORDER — HEPARIN SODIUM 1000 [USP'U]/ML
INJECTION, SOLUTION INTRAVENOUS; SUBCUTANEOUS AS NEEDED
Status: DISCONTINUED | OUTPATIENT
Start: 2018-06-06 | End: 2018-06-06 | Stop reason: HOSPADM

## 2018-06-06 RX ORDER — MIDAZOLAM HYDROCHLORIDE 1 MG/ML
INJECTION INTRAMUSCULAR; INTRAVENOUS AS NEEDED
Status: DISCONTINUED | OUTPATIENT
Start: 2018-06-06 | End: 2018-06-06 | Stop reason: HOSPADM

## 2018-06-06 RX ORDER — FENTANYL CITRATE 50 UG/ML
INJECTION, SOLUTION INTRAMUSCULAR; INTRAVENOUS AS NEEDED
Status: DISCONTINUED | OUTPATIENT
Start: 2018-06-06 | End: 2018-06-06 | Stop reason: HOSPADM

## 2018-06-06 RX ORDER — DIAZEPAM 5 MG/1
5 TABLET ORAL ONCE
Status: COMPLETED | OUTPATIENT
Start: 2018-06-06 | End: 2018-06-06

## 2018-06-06 RX ORDER — ASPIRIN 81 MG/1
81 TABLET, CHEWABLE ORAL DAILY
Status: DISCONTINUED | OUTPATIENT
Start: 2018-06-06 | End: 2018-06-06 | Stop reason: SDUPTHER

## 2018-06-06 RX ADMIN — INSULIN ASPART 2 UNITS: 100 INJECTION, SOLUTION INTRAVENOUS; SUBCUTANEOUS at 17:31

## 2018-06-06 RX ADMIN — SODIUM CHLORIDE 1 ML/KG/HR: 9 INJECTION, SOLUTION INTRAVENOUS at 10:28

## 2018-06-06 RX ADMIN — HYDROCODONE BITARTRATE AND ACETAMINOPHEN 1 TABLET: 10; 325 TABLET ORAL at 10:28

## 2018-06-06 RX ADMIN — TAMSULOSIN HYDROCHLORIDE 0.4 MG: 0.4 CAPSULE ORAL at 20:53

## 2018-06-06 RX ADMIN — PANTOPRAZOLE SODIUM 40 MG: 40 TABLET, DELAYED RELEASE ORAL at 17:31

## 2018-06-06 RX ADMIN — ALLOPURINOL 300 MG: 300 TABLET ORAL at 20:54

## 2018-06-06 RX ADMIN — METOPROLOL SUCCINATE 25 MG: 25 TABLET, FILM COATED, EXTENDED RELEASE ORAL at 20:54

## 2018-06-06 RX ADMIN — HEPARIN SODIUM 5000 UNITS: 5000 INJECTION, SOLUTION INTRAVENOUS; SUBCUTANEOUS at 20:54

## 2018-06-06 RX ADMIN — INSULIN ASPART 3 UNITS: 100 INJECTION, SOLUTION INTRAVENOUS; SUBCUTANEOUS at 20:55

## 2018-06-06 RX ADMIN — DIAZEPAM 5 MG: 5 TABLET ORAL at 08:40

## 2018-06-06 RX ADMIN — LISINOPRIL 20 MG: 10 TABLET ORAL at 20:54

## 2018-06-06 RX ADMIN — ASPIRIN 81 MG: 81 TABLET ORAL at 08:40

## 2018-06-06 RX ADMIN — TICAGRELOR 90 MG: 90 TABLET ORAL at 20:54

## 2018-06-06 RX ADMIN — HYDROCHLOROTHIAZIDE 12.5 MG: 12.5 CAPSULE, GELATIN COATED ORAL at 20:53

## 2018-06-06 RX ADMIN — HYDROCODONE BITARTRATE AND ACETAMINOPHEN 1 TABLET: 10; 325 TABLET ORAL at 17:31

## 2018-06-06 RX ADMIN — INSULIN ASPART 3 UNITS: 100 INJECTION, SOLUTION INTRAVENOUS; SUBCUTANEOUS at 11:39

## 2018-06-06 NOTE — PROGRESS NOTES
LOS: 0 days     Name: Adalberto Llamas  Age/Sex: 63 y.o. male  :  1955        PCP: Fish Delaney MD  REF: No ref. provider found    Principal Problem:    Other chest pain  Active Problems:    Chest pain      Reason for follow-up: Recent chest pains and an abnormal nuclear stress test.  He is better post intervention.    Subjective  Mr. Llamas is a pleasant 63-year-old  male with history of obstructive coronary artery disease on coronary angiography in 2016 with about 50-60% narrowing in the distal LAD, multiple risk factors.  Clear disease including type 2 diabetes mellitus and hypertension and previous history of smoking as well as obesity and a family history of premature clear disease, presented with complaints of having recurrent left lower chest pains for the last few days. He says his chest pains at times relieved with belching.  He denies any associated shortness of breath or any radiation of pain into the arm neck or back.      He has also been feeling tired and weak which is unusual for him as he is fairly active person according to his wife.  His EKG revealed no acute ischemic is a myocardial ischemia but there was subtle ST elevation in lead V2 at admission that remained unchanged.  Cardiac markers revealed marginal elevation of troponin at 0.08.  His Lexiscan sestamibi study earlier today revealed normal myocardial perfusion but there was TID of 1.47 which could potentially represent balanced ischemia.  His gated SPECT scans revealed apical dyskinesis.       Interval History:Patient feels better post intervention.    ROS    Vital Signs  Temp:  [97.6 °F (36.4 °C)-98.3 °F (36.8 °C)] 97.9 °F (36.6 °C)  Heart Rate:  [63-94] 67  Resp:  [16-20] 18  BP: (125-158)/(67-86) 141/75  Vital Signs (last 72 hrs)       06/03 0700  -  06/04 0659 06/04 0700  -  06/05 0659  07  -   0659  07  -   1049   Most Recent    Temp (°F)   98.1 -  98.5    97.6 -  98.3      97.9      97.9 (36.6)    Heart Rate   58 -  78    63 -  94      67     67    Resp   16 -  20      20    16 -  18     18    BP   115/63 -  170/85    125/75 -  158/86      141/75     141/75    SpO2 (%)   92 -  98    93 -  99      96     96        Body mass index is 35.35 kg/m².    Intake/Output Summary (Last 24 hours) at 06/06/18 1049  Last data filed at 06/06/18 0327   Gross per 24 hour   Intake              240 ml   Output                0 ml   Net              240 ml     Objective        Physical Exam:     General Appearance:    Alert, cooperative, in no acute distress   Head:    Normocephalic, without obvious abnormality, atraumatic   Eyes:            Conjunctivae and sclerae normal, no   icterus, no pallor, corneas clear.   Neck:   No adenopathy, supple, trachea midline, no thyromegaly, no   carotid bruit, no JVD   Lungs:     Clear to auscultation,respirations regular, even and                  unlabored    Heart:    Regular rhythm and normal rate, normal S1 and S2, no            murmur, no gallop, no rub, no click   Chest Wall:    No abnormalities observed   Abdomen:     Normal bowel sounds, no masses, no organomegaly, soft        non-tender, non-distended, no guarding, no rebound                tenderness   Extremities:   Moves all extremities well, no edema, no cyanosis, no             redness   Pulses:   Pulses palpable and equal bilaterally   Skin:   No bleeding, bruising or rash              Procedures    Results Review:     Results from last 7 days  Lab Units 06/06/18  0057 06/05/18  0020   WBC 10*3/mm3 6.38 7.20   HEMOGLOBIN g/dL 14.9 15.5   PLATELETS 10*3/mm3 118* 112*       Results from last 7 days  Lab Units 06/06/18  0057 06/05/18  0020   SODIUM mmol/L 136 138   POTASSIUM mmol/L 3.6 4.0   CHLORIDE mmol/L 107 104   CO2 mmol/L 22.6* 25.9   BUN mg/dL 12 18   CREATININE mg/dL 0.74 0.88   CALCIUM mg/dL 9.4 10.1*   GLUCOSE mg/dL 152* 203*   ALT (SGPT) U/L 29 29   AST (SGOT) U/L 23 31       Results from last 7  days  Lab Units 06/05/18  1555 06/05/18  1049 06/05/18  0450 06/05/18  0155 06/05/18  0020   CK TOTAL U/L  --  115 139 163  --    TROPONIN I ng/mL 0.056* 0.054* 0.080* 0.074* 0.078*   CKMB ng/mL 2.59 2.19 3.12 3.30  --    MYOGLOBIN ng/mL 71.0 75.0 64.0  --   --      Lab Results   Component Value Date    INR 0.95 04/23/2016    INR 0.96 04/19/2016     Lab Results   Component Value Date    MG 2.2 06/06/2018     Lab Results   Component Value Date    TSH 0.744 06/05/2018    CHLPL 100 05/05/2014    TRIG 432 (H) 06/05/2018    HDL 28 (L) 06/05/2018    LDL  06/05/2018      Comment:      Unable to calculate      Cardiac catheterization and coronary intervention earlier today:    Coronary anatomy:     The left main coronary artery bifurcated into the LAD and left circumflex coronary.  The LAD coursed in the anterior interventricular groove, gave rise to diagonal branches and reached the apex.     Left circumflex coursed in the left atrial ventricular groove and gives rise to several marginal branches.     The right coronary artery course in the right atrial ventricular groove I gave rise to several acute marginal branches.                               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 A properly fitting 6 Israeli guiding catheter was advanced to the coronary artery and 0.014 BMW coronary guidewire was utilized to cross the lesion. PCI was performed with balloon angioplasty followed by drug  eluting stent placement. The critical lesion was treated and post-procedural angiography revealed 0% residual stenosis and JULEE III flow. No complications were noted and all equipment removed.      Impression:      Single vessel CAD  90% diagonal lesion  Successful PCI KEVIN D1     Post-operative Diagnosis:  Significant single vessel CAD     Jax Chappell MD       I reviewed the patient's new clinical results.    Telemetry: Sinus rhythm in the 60s and 70s.       Medication Review:     allopurinol 300 mg Oral Nightly   aspirin 81 mg Oral Daily   atorvastatin 40 mg Oral Daily   carvedilol 6.25 mg Oral Q12H   heparin (porcine) 5,000 Units Subcutaneous Q12H   lisinopril 20 mg Oral Nightly   And      Hydrochlorothiazide Oral 12.5 mg Oral Nightly   insulin aspart 0-7 Units Subcutaneous 4x Daily AC & at Bedtime   metoprolol succinate XL 25 mg Oral Nightly   pantoprazole 40 mg Oral BID AC   tamsulosin 0.4 mg Oral Nightly   ticagrelor 90 mg Oral BID         sodium chloride 1 mL/kg/hr Last Rate: 1 mL/kg/hr (06/06/18 1028)       Assessment:  1. Recent class III angina.  2. ASCVD with severe stenosis in the diagonal branch of the LAD, status post PCI earlier today, patient is feeling better.  3. Multiple risk factors for coronary artery disease.    Recommendations:  1. Continue with low-dose aspirin, Ticagrelor, atorvastatin, carvedilol and lisinopril.  2. Possible discharge home in a.m.    I discussed the patients findings and my recommendations with patient and family        John Davis MD[facility] Services  06/06/18  10:49 AM    Dragon disclaimer:  Much of this encounter note is an electronic transcription/translation of spoken language to printed text. The electronic translation of spoken language may permit erroneous, or at times, nonsensical words or phrases to be inadvertently transcribed; Although I have reviewed the note for such errors, some may still exist.

## 2018-06-06 NOTE — CONSULTS
"Diabetes Education  Assessment/Teaching    Patient Name:  Adalberto Llamas  YOB: 1955  MRN: [id number]  Admit Date:  6/4/2018      Assessment Date:  6/6/2018    Most Recent Value   General Information    Height  188 cm (74\")   Height Method  Stated   Weight  125 kg (275 lb 4.8 oz)   Weight Method  Bed scale   Pregnancy Assessment   Diabetes History   What type of diabetes do you have?  Type 2   Length of Diabetes Diagnosis  6 - 10 years [\"8 years\"]   Have you had diabetes education/teaching in the past?  no   Do you test your blood sugar at home?  no   What makes it difficult for you to take care of your diabetes or yourself?  Dr Delaney said it was \" doing good\" about 2 months ago client admitted to only taking\"1 metformin for quite a  while\" \"supposed to be taking 2\",says wife    Do you have any diabetes complications?  heart disease   Education Preferences   Nutrition Information   Assessment Topics   Healthy Eating - Assessment  Competent   Being Active - Assessment  Competent   Taking Medication - Assessment  Competent   Problem Solving - Assessment  Competent   Reducing Risk - Assessment  Competent   Healthy Coping - Assessment  Competent   Monitoring - Assessment  Competent   DM Goals            Most Recent Value   DM Education Needs   Meter  Has own   Medication  Oral   Problem Solving  Hypoglycemia, Hyperglycemia, Sick days, Signs, Symptoms, Treatment, Other (comment)   Reducing Risks  A1C testing [A1c 8.4 discussed & explained]   Healthy Coping  Appropriate   Discharge Plan  Home   Motivation  Moderate   Teaching Method  Explanation, Discussion, Handouts, Teach back   Patient Response  Verbalized understanding            Other Comments:  Name & phone # provided and discussed outpatient Diabetes Smart briefly         Electronically signed by:  Iris Miller RN  06/06/18 2:33 PM  "

## 2018-06-06 NOTE — CONSULTS
"Diabetes Education  Assessment/Teaching    Patient Name:  Adalberto Llamas  YOB: 1955  MRN: [id number]  Admit Date:  6/4/2018      Assessment Date:  6/6/2018    Most Recent Value   General Information    Height  188 cm (74\")   Height Method  Stated   Weight  125 kg (275 lb 4.8 oz)   Weight Method  Bed scale   Pregnancy Assessment   Diabetes History   What type of diabetes do you have?  Type 2   Length of Diabetes Diagnosis  6 - 10 years [\"8 years\"]   Have you had diabetes education/teaching in the past?  no   Do you test your blood sugar at home?  no   What makes it difficult for you to take care of your diabetes or yourself?  Dr Delaney said it was \" doing good\" about 2 months ago client admitted to only taking 1 metformin \"for quite a  while\" supposed to be taking 2says wife    Do you have any diabetes complications?  heart disease   Education Preferences   Nutrition Information   Assessment Topics   Healthy Eating - Assessment  Competent   Being Active - Assessment  Competent   Taking Medication - Assessment  Competent   Problem Solving - Assessment  Competent   Reducing Risk - Assessment  Competent   Healthy Coping - Assessment  Competent   Monitoring - Assessment  Competent   DM Goals            Most Recent Value   DM Education Needs   Meter  Has own   Medication  Oral   Problem Solving  Hypoglycemia, Hyperglycemia, Sick days, Signs, Symptoms, Treatment, Other (comment)   Reducing Risks  A1C testing [A1c 8.4 discussed & explained]   Healthy Coping  Appropriate   Discharge Plan  Home   Motivation  Moderate   Teaching Method  Explanation, Discussion, Handouts, Teach back   Patient Response  Verbalized understanding            Other Comments:  Call if needs change        Electronically signed by:  Iris Miller RN  06/06/18 2:44 PM  "

## 2018-06-06 NOTE — PLAN OF CARE
Problem: Patient Care Overview  Goal: Plan of Care Review  Outcome: Ongoing (interventions implemented as appropriate)   06/05/18 2000   Coping/Psychosocial   Plan of Care Reviewed With patient     Goal: Discharge Needs Assessment  Outcome: Ongoing (interventions implemented as appropriate)   06/05/18 0404 06/05/18 0405 06/05/18 0735   Discharge Needs Assessment   Readmission Within the Last 30 Days --  --  no previous admission in last 30 days   Patient/Family Anticipates Transition to --  home --    Patient/Family Anticipated Services at Transition --  none --    Transportation Anticipated --  family or friend will provide --    Disability   Equipment Currently Used at Home none --  --        Problem: Cardiac Output Decreased (Adult)  Goal: Identify Related Risk Factors and Signs and Symptoms  Outcome: Ongoing (interventions implemented as appropriate)   06/05/18 0952   Cardiac Output Decreased (Adult)   Related Risk Factors (Cardiac Output Decreased) disease process   Signs and Symptoms (Cardiac Output Decreased) angina     Goal: Effective Tissue Perfusion  Outcome: Ongoing (interventions implemented as appropriate)   06/05/18 0952   Cardiac Output Decreased (Adult)   Effective Tissue Perfusion making progress toward outcome       Problem: Pain, Acute (Adult)  Goal: Acceptable Pain Control/Comfort Level  Outcome: Ongoing (interventions implemented as appropriate)   06/05/18 2112   Pain, Acute (Adult)   Acceptable Pain Control/Comfort Level making progress toward outcome       Problem: Diabetes, Type 2 (Adult)  Goal: Signs and Symptoms of Listed Potential Problems Will be Absent, Minimized or Managed (Diabetes, Type 2)  Outcome: Ongoing (interventions implemented as appropriate)   06/05/18 2112   Goal/Outcome Evaluation   Problems Assessed (Type 2 Diabetes) all   Problems Present (Type 2 Diabetes) none

## 2018-06-06 NOTE — CONSULTS
Time In 1810 Time Out 1820      Order received for Cardiac Rehab Consultation.     CR staff will follow up with patient and Patient will be scheduled at [facility]      Information discussed with: Patient/Spouse        Educated on: Benefits of Exercise,  Educated on Cardiac Rehab and Program Protocol, Brochure and/or educational material provided, Contact information given and Teach Back Verified    Comments: Pt interested in attending.  Will attmemp to schedule prior to d/c, if pt d/c'd prior to scheduling will contact via phone to schedule.       Thank you for the referral. Please contact the Cardiac Rehab Dept. (ext. [phone]) with any further questions or concerns.

## 2018-06-06 NOTE — PROGRESS NOTES
[facility] HOSPITALIST PROGRESS NOTE     Patient Identification:  Name:  Adalberto Llamas  Age:  63 y.o.  Sex:  male  :  1955  MRN:  [id number]  Visit Number:  [id number]  ROOM: [address] [place] [address]     Primary Care Provider:  Fish Delaney MD    Length of stay:  0    Chief complaint:    Chest pain better after stent. No complaints    Subjective:    No complaints  Current Hospital Meds:  allopurinol 300 mg Oral Nightly   aspirin 81 mg Oral Daily   atorvastatin 40 mg Oral Daily   carvedilol 6.25 mg Oral Q12H   heparin (porcine) 5,000 Units Subcutaneous Q12H   lisinopril 20 mg Oral Nightly   And      Hydrochlorothiazide Oral 12.5 mg Oral Nightly   insulin aspart 0-7 Units Subcutaneous 4x Daily AC & at Bedtime   metoprolol succinate XL 25 mg Oral Nightly   pantoprazole 40 mg Oral BID AC   tamsulosin 0.4 mg Oral Nightly   ticagrelor 90 mg Oral BID     sodium chloride 1 mL/kg/hr Last Rate: 1 mL/kg/hr (18 1028)     ----------------------------------------------------------------------------------------------------------------------  Vital Signs:  Temp:  [97.6 °F (36.4 °C)-98.3 °F (36.8 °C)] 97.9 °F (36.6 °C)  Heart Rate:  [63-94] 67  Resp:  [16-20] 18  BP: (125-150)/(67-77) 141/75  SpO2:  [93 %-99 %] 96 %  on  Flow (L/min):  [2] 2;   Device (Oxygen Therapy): nasal cannula  Body mass index is 35.35 kg/m².    Wt Readings from Last 1 Encounters:   18 0327 125 kg (275 lb 4.8 oz)   18 0414 125 kg (275 lb 8 oz)   18 2310 125 kg (275 lb)     Wt Readings from Last 3 Encounters:   18 125 kg (275 lb 4.8 oz)   16 125 kg (276 lb)   11/15/16 125 kg (275 lb)     Intake/Output Summary (Last 24 hours) at 18 1318  Last data filed at 18 0327   Gross per 24 hour   Intake              240 ml   Output                0 ml   Net              240 ml     Diet Regular; Consistent Carbohydrate,  Cardiac  ----------------------------------------------------------------------------------------------------------------------  Physical exam:  Constitutional:  Well-developed and well-nourished.  No respiratory distress.      HENT:  Head:  Normocephalic and atraumatic.  Mouth:  Moist mucous membranes.    Eyes:  Conjunctivae and EOM are normal.  Pupils are equal, round, and reactive to light.  No scleral icterus.    Neck:  Neck supple.  No JVD present.    Cardiovascular:  Normal rate, regular rhythm and normal heart sounds with no murmur.  Pulmonary/Chest:  No respiratory distress, no wheezes, no crackles, with normal breath sounds and good air movement.  Abdominal:  Soft.  Bowel sounds are normal.  No distension and no tenderness.   Musculoskeletal:  No edema, no tenderness, and no deformity.  No red or swollen joints anywhere.    Neurological:  Alert and oriented to person, place, and time.  No cranial nerve deficit.  No tongue deviation.  No facial droop.  No slurred speech.   Skin:  Skin is warm and dry. No rash noted. No pallor.   Peripheral vascular:  Strong pulses in all 4 extremities with no clubbing, no cyanosis, no edema.  Genitourinary:  ----------------------------------------------------------------------------------------------------------------------  ----------------------------------------------------------------------------------------------------------------------  Results from last 7 days  Lab Units 06/05/18  1555 06/05/18  1049 06/05/18  0450 06/05/18  0155   CK TOTAL U/L  --  115 139 163   CKMB ng/mL 2.59 2.19 3.12 3.30   CK MB INDEX %  --   --  2.2 2.0   TROPONIN I ng/mL 0.056* 0.054* 0.080* 0.074*   MYOGLOBIN ng/mL 71.0 75.0 64.0  --      Results from last 7 days  Lab Units 06/06/18  0057 06/05/18  0020   WBC 10*3/mm3 6.38 7.20   HEMOGLOBIN g/dL 14.9 15.5   HEMATOCRIT % 44.0 44.9   MCV fL 98.0* 97.6*   MCHC g/dL 33.9 34.5   PLATELETS 10*3/mm3 118* 112*     Results from last 7 days  Lab Units  06/06/18  0057 06/05/18  0020   SODIUM mmol/L 136 138   POTASSIUM mmol/L 3.6 4.0   MAGNESIUM mg/dL 2.2  --    CHLORIDE mmol/L 107 104   CO2 mmol/L 22.6* 25.9   BUN mg/dL 12 18   CREATININE mg/dL 0.74 0.88   EGFR IF NONAFRICN AM mL/min/1.73 106 87   CALCIUM mg/dL 9.4 10.1*   GLUCOSE mg/dL 152* 203*   ALBUMIN g/dL 3.80 4.30   BILIRUBIN mg/dL 1.2 0.7   ALK PHOS U/L 66 73   AST (SGOT) U/L 23 31   ALT (SGPT) U/L 29 29   Estimated Creatinine Clearance: 129.3 mL/min (by C-G formula based on SCr of 0.74 mg/dL).    Hemoglobin A1C   Date/Time Value Ref Range Status   06/05/2018 0020 8.40 (H) 4.50 - 5.70 % Final     Glucose   Date/Time Value Ref Range Status   06/06/2018 1109 232 (H) 70 - 130 mg/dL Final   06/06/2018 0625 176 (H) 70 - 130 mg/dL Final   06/05/2018 1942 143 (H) 70 - 130 mg/dL Final   06/05/2018 1610 148 (H) 70 - 130 mg/dL Final   06/05/2018 1103 187 (H) 70 - 130 mg/dL Final     No results found for: AMMONIA  Results from last 7 days  Lab Units 06/05/18  1049   CHOLESTEROL mg/dL 148   TRIGLYCERIDES mg/dL 432*   HDL CHOL mg/dL 28*       Results from last 7 days  Lab Units 06/05/18  0155   NITRITE UA  Negative             pH No results found for: PHART   pO2 No results found for: PO2ART   pCO2 No results found for: NIY9HGD   HCO3 No results found for: TNU2JIM   I have personally looked at the labs and they are summarized above.  ----------------------------------------------------------------------------------------------------------------------  Imaging Results (last 24 hours)     Procedure Component Value Units Date/Time    CT Abdomen Pelvis Without Contrast [[id number]] Collected:  06/05/18 1522     Updated:  06/05/18 1527    Narrative:       EXAM: CT ABDOMEN PELVIS WO CONTRAST-              TECHNIQUE: Multiple axial CT images were obtained from lung bases  through pubic symphysis WITHOUT administration of IV contrast.  Reformatted images in the coronal and/or sagittal plane(s) were  generated from the axial data  set to facilitate diagnostic accuracy  and/or surgical planning.  Oral Contrast:NONE.     Radiation dose reduction techniques were utilized per ALARA protocol.  Automated exposure control was initiated through either or [employer] or  DoseRight software packages by  protocol.       DOSE: 1234.32 mGy.cm     Clinical information  Abdominal pain, unspecified; R07.9-Chest pain, unspecified      Comparison  Comparison: 04/23/2016     Findings  LOWER THORAX: CARDIOMEGALY WITH MODERATE CORONARY VASCULAR  CALCIFICATIONS.     ABDOMEN:        LIVER: DIFFUSE FATTY INFILTRATION OF LIVER. LIVER CIRRHOSIS AND  PROBABLE CHANGES OF PORTAL HYPERTENSION GIVEN PRESENCE OF PROMINENT  SPLENIC COLLATERAL VESSELS.        GALLBLADDER: No dilation or stone identified.        PANCREAS: Unremarkable. No mass or ductal dilatation.        SPLEEN: Homogeneous. No splenomegaly.        ADRENALS: No mass.        KIDNEYS/URETERS: NONOBSTRUCTING BILATERAL KIDNEY STONES. NO  HYDRONEPHROSIS.        GI TRACT: NONSPECIFIC BOWEL GAS PATTERN WITH SCATTERED FLUID LEVELS  WHICH CAN BE SEEN WITH MILD ENTERITIS. NO BOWEL OBSTRUCTION.        PERITONEUM: No free air. No free fluid or loculated fluid  collections.        MESENTERY: Unremarkable.        LYMPH NODES: No lymphadenopathy.        VASCULATURE: MODERATE ATHEROSCLEROSIS OF THE ABDOMINAL ARTERIAL  VASCULATURE BUT NO EVIDENCE OF ANEURYSM.        ABDOMINAL WALL: No focal hernia or mass.           OTHER: None.     PELVIS:        BLADDER: URINARY BLADDER WALL THICKENING LIKELY IN THE SETTING OF  PROSTATE ENLARGEMENT AND DUE TO INCOMPLETE DISTENTION.        REPRODUCTIVE: Unremarkable as visualized.        APPENDIX: Nondistended. No surrounding inflammation.     BONES: MULTILEVEL DEGENERATIVE CHANGES LUMBAR SPINE WITH MULTILEVEL  STENOSIS BUT NO ACUTE BONY FINDINGS IDENTIFIED.       Impression:       Impression:  1. FATTY INFILTRATION OF LIVER AND LIVER CIRRHOSIS WITH SPLENORENAL  SHUNTING AND APPEARS  STABLE.  2. NONOBSTRUCTING KIDNEY STONES.  3. MILD ILEUS PROBABLY IN THE SETTING OF ENTERITIS. NO BOWEL OBSTRUCTION  IDENTIFIED.  4. URINARY BLADDER WALL THICKENING AND PROSTATE ENLARGEMENT. CORRELATE  CLINICALLY.  5. OTHER NONACUTE FINDINGS AS ABOVE.     This report was finalized on 6/5/2018 3:25 PM by Dr. Alexis Aleman MD.           I have personally reviewed the radiology images and read the final radiology report.----------------------------------------------------------------------------------------------------------------------  Assessment and Plan:  Principal Problem:    Other chest pain  Active Problems:    Chest pain        Chest pain, rule out MI with possible GI artifact   -Reported history of recently diagnosed hiatal hernia   -Indeterminate troponin elevation in setting of known coronary artery disease   -Essential hypertension   -Hyperlipidemia   -Non insulin dependent type II diabetes mellitus with hyperglycemia present on admission   -Diabetic peripheral neuropathy   -History of gout   -Degenerative disc disease with chronic low back pain   -History of fatty liver infiltration   -Mild macrocytosis without anemia   -Obesity, BMI 35.37 kg/m²  Ileus  BPH      S/p PCI. DAPT therapy. Statin  D-dime negative  Will need fu for the BPH with psa on discharge   B12/tsh nl. Fu folate levels  seen anyone for his hernia. I will obtain CT of the abdomen to evaluate further and rule out any incarceration and to see degree of hiatal hernia. Will start patient on BID PPI with PO Protonix. Maalox MAX available PRN. Will also obtain H. Pylori IgM. Further care pending laboratory/radiological results.    Patient is a known diabetic, non insulin dependent. Will obtain HgbA1C to evaluate glycemic control. Will hold home oral DM medications to prevent hypoglycemia while patient NPO for radiological studies. Hypoglycemia protocol in place should the patient become hypoglycemic. Will closely monitor blood glucose levels  with fu with C and QHS. Sliding scale insulin available PRN for now, titrate dosage as necessary.    Continue to closely monitor electrolytes and replace per protocol as necessary. Will review patient's home medications once reconciled per pharmacy and resume as appropriate. Will repeat labs in AM and continue to closely monitor the patient on telemetry.         DVT Prophylaxis: SQ Heparin   GI Prophylaxis: PO Protonix BID      The patient is considered to be a high risk patient due to: Chest pain, DM, HTN, HLD, obesity, troponin elevation     I have discussed the patient's assessment and plan with the patient and patient's wife present at bedside.         Ishmael Ruiz MD  06/06/18  1:18 PM

## 2018-06-06 NOTE — PLAN OF CARE
Problem: Patient Care Overview  Goal: Plan of Care Review  Outcome: Ongoing (interventions implemented as appropriate)    Goal: Individualization and Mutuality  Outcome: Ongoing (interventions implemented as appropriate)    Goal: Discharge Needs Assessment  Outcome: Ongoing (interventions implemented as appropriate)    Goal: Interprofessional Rounds/Family Conf  Outcome: Ongoing (interventions implemented as appropriate)      Problem: Cardiac Output Decreased (Adult)  Goal: Identify Related Risk Factors and Signs and Symptoms  Outcome: Ongoing (interventions implemented as appropriate)    Goal: Effective Tissue Perfusion  Outcome: Ongoing (interventions implemented as appropriate)      Problem: Pain, Acute (Adult)  Goal: Acceptable Pain Control/Comfort Level  Outcome: Ongoing (interventions implemented as appropriate)      Problem: Diabetes, Type 2 (Adult)  Goal: Signs and Symptoms of Listed Potential Problems Will be Absent, Minimized or Managed (Diabetes, Type 2)  Outcome: Ongoing (interventions implemented as appropriate)

## 2018-06-07 VITALS
BODY MASS INDEX: 35.33 KG/M2 | TEMPERATURE: 98.4 F | HEART RATE: 65 BPM | OXYGEN SATURATION: 96 % | HEIGHT: 74 IN | DIASTOLIC BLOOD PRESSURE: 65 MMHG | RESPIRATION RATE: 20 BRPM | SYSTOLIC BLOOD PRESSURE: 107 MMHG | WEIGHT: 275.3 LBS

## 2018-06-07 LAB
GLUCOSE BLDC GLUCOMTR-MCNC: 202 MG/DL (ref 70–130)
H PYLORI IGM SER-ACNC: <9 UNITS (ref 0–8.9)

## 2018-06-07 PROCEDURE — 82962 GLUCOSE BLOOD TEST: CPT

## 2018-06-07 PROCEDURE — 93010 ELECTROCARDIOGRAM REPORT: CPT | Performed by: INTERNAL MEDICINE

## 2018-06-07 PROCEDURE — 99238 HOSP IP/OBS DSCHRG MGMT 30/<: CPT | Performed by: HOSPITALIST

## 2018-06-07 PROCEDURE — 25010000002 HEPARIN (PORCINE) PER 1000 UNITS: Performed by: INTERNAL MEDICINE

## 2018-06-07 PROCEDURE — 63710000001 INSULIN ASPART PER 5 UNITS: Performed by: PHYSICIAN ASSISTANT

## 2018-06-07 PROCEDURE — 93005 ELECTROCARDIOGRAM TRACING: CPT | Performed by: INTERNAL MEDICINE

## 2018-06-07 RX ORDER — NITROGLYCERIN 0.4 MG/1
0.4 TABLET SUBLINGUAL
Qty: 30 TABLET | Refills: 12 | Status: SHIPPED | OUTPATIENT
Start: 2018-06-07 | End: 2021-10-20

## 2018-06-07 RX ORDER — ASPIRIN 81 MG/1
81 TABLET ORAL DAILY
Qty: 30 TABLET | Refills: 0 | Status: SHIPPED | OUTPATIENT
Start: 2018-06-07 | End: 2018-07-07

## 2018-06-07 RX ORDER — METOPROLOL SUCCINATE 50 MG/1
50 TABLET, EXTENDED RELEASE ORAL NIGHTLY
Qty: 30 TABLET | Refills: 0 | Status: SHIPPED | OUTPATIENT
Start: 2018-06-07 | End: 2018-07-07

## 2018-06-07 RX ORDER — ATORVASTATIN CALCIUM 40 MG/1
40 TABLET, FILM COATED ORAL DAILY
Qty: 30 TABLET | Refills: 0 | Status: SHIPPED | OUTPATIENT
Start: 2018-06-08 | End: 2018-10-09 | Stop reason: SDUPTHER

## 2018-06-07 RX ORDER — METOPROLOL SUCCINATE 50 MG/1
50 TABLET, EXTENDED RELEASE ORAL NIGHTLY
Status: DISCONTINUED | OUTPATIENT
Start: 2018-06-07 | End: 2018-06-07 | Stop reason: HOSPADM

## 2018-06-07 RX ADMIN — HYDROCODONE BITARTRATE AND ACETAMINOPHEN 1 TABLET: 10; 325 TABLET ORAL at 01:53

## 2018-06-07 RX ADMIN — ATORVASTATIN CALCIUM 40 MG: 40 TABLET, FILM COATED ORAL at 08:34

## 2018-06-07 RX ADMIN — HEPARIN SODIUM 5000 UNITS: 5000 INJECTION, SOLUTION INTRAVENOUS; SUBCUTANEOUS at 08:35

## 2018-06-07 RX ADMIN — INSULIN ASPART 3 UNITS: 100 INJECTION, SOLUTION INTRAVENOUS; SUBCUTANEOUS at 08:35

## 2018-06-07 RX ADMIN — PANTOPRAZOLE SODIUM 40 MG: 40 TABLET, DELAYED RELEASE ORAL at 08:34

## 2018-06-07 RX ADMIN — HYDROCODONE BITARTRATE AND ACETAMINOPHEN 1 TABLET: 10; 325 TABLET ORAL at 08:34

## 2018-06-07 RX ADMIN — TICAGRELOR 90 MG: 90 TABLET ORAL at 08:32

## 2018-06-07 RX ADMIN — ASPIRIN 81 MG: 81 TABLET ORAL at 08:34

## 2018-06-07 NOTE — DISCHARGE INSTR - APPOINTMENTS
PT  HAS  AN  APT  WITH  DR KIRKPATRICK  FOR  June 21  AT  2:15  AND  DR JONES  FOR  June 13  AT  4:20 AND  AN  APT  WITH  DR BACK  FOR  June 14  AT 2 PM

## 2018-06-07 NOTE — PHARMACY PATIENT ASSISTANCE
Pharmacy was consulted to evaluate patient co-pay for Brilinta. The patients insurance reported that the patient has a $165.85 deductible left to meet and after this cost is met his co-pay for Brilinta would be $58.68. The patient is aware of this information and no other needs have been noted at this time. The patient has also been provided a free 30 day trial card for Brilinta for discharge.       Thank you,  Cherelle Blue. Hernan [facility]  06/07/18  9:18 AM

## 2018-06-07 NOTE — PLAN OF CARE
Problem: Diabetes, Type 2 (Adult)  Goal: Signs and Symptoms of Listed Potential Problems Will be Absent, Minimized or Managed (Diabetes, Type 2)  Outcome: Ongoing (interventions implemented as appropriate)

## 2018-06-07 NOTE — DISCHARGE SUMMARY
"    [facility] HOSPITALISTS DISCHARGE SUMMARY    Patient Identification:  Name:  Adalberto Llamas  Age:  63 y.o.  Sex:  male  :  1955  MRN:  [id number]  Visit Number:  [id number]    Date of Admission: 2018  Date of Discharge:  2018     PCP: Fish Delaney MD    DISCHARGE DIAGNOSIS  CAD.   Chest pain  Obesity     CONSULTS   Cardiology     PROCEDURES PERFORMED  Cardiac cath/PCI/stent  Stress test    HOSPITAL COURSE        Patient is a 63 y.o. male with past medical history significant for coronary artery disease, essential hypertension, hyperlipidemia, non-insulin dependent type II diabetes mellitus, fatty liver disease, chronic back pain due to degenerative disc disease, history of gout, and peripheral neuropathy that presented to the [facility] emergency department for evaluation of chest pain. Patient states that onset of symptoms was 3 days ago. Patient states that the pain is located her his right rib cage anteriorly. He states that the pain is moderate in severity, without any radiation. He states that the pain has been constant and he describes it as \"gas like\" pain. He states that he has been belching frequently. He denies any relievers of his symptoms, but states that eating does exacerbate his pain. He states that since time of onset, he has experienced sensation of smothering at times. Patient's wife present at bedside states that since time of onset, patient has not been able to do much but lie in bed due to not feeling well which is not normal for him. He states that he was recently diagnosed with hiatal hernia by his PCP, he has not undergone any further testing/treatment and has not been seen for this hernia. He denies any nausea or vomiting, no change in bowel movements. He denies any fevers or chills. He denies any increase or change in extremity edema or abdominal distension. He denies any urinary symptoms.       Upon arrival to the ED, vitals were temperature " 98.1, heart rate 75, RR 16, /85, and oxygen saturation 96% on room air. Initial troponin 0.078. CMP with glucose 203, otherwise unremarkable. Lipase WNL. CBC with MCV 97.6, otherwise unremarkable. U/A with dark yellow appearance, 2+ glucose, and trace ketones. CXR with no evidence of acute cardiopulmonary disease. While in the ED, patient was given GI cocktail with 5 ml Maalox Max, 5 ML 2% viscous lidocaine, and 32.4 mg Donnatal. Patient was given full dose 324 aspirin as well as 1 inch nitropaste.       Admitted stress test for chest pain fatigue. Lexiscan sestamibi study revealing increased TID At 1.47 although there was no myocardial perfusion defect.  Hence this could be a balanced ischemia. Had Multiple risk factors for coronary artery disease including type 2 diabetes mellitus, hypertension, dyslipidemia, obesity and a strong family history of premature clear disease and previous history of smoking. Also had previous history of nonobstructive coronary artery disease with about 50-60% narrowing in the distal LAD in April 2016. Treated with aspirin, statin and a beta blocker as tolerated. Underwent cardiac catheterization found to have Single vessel CAD, 90% diagonal lesion Successful PCI KEVIN D1. Did well. DAPT discussed and indications. HE understands he cannot stop brillinta or ASA until cleared by Cardiology due to risk of heart attack and death. Also informed tof ollow up with PCP and Urology for the BPH to ensure the prostate enlargement is not malignant/cancer. Discussed in presence of nurse and wife      VITAL SIGNS:  Temp:  [98.1 °F (36.7 °C)-98.4 °F (36.9 °C)] 98.4 °F (36.9 °C)  Heart Rate:  [65-70] 65  Resp:  [20] 20  BP: (101-111)/(52-66) 107/65  SpO2:  [95 %-98 %] 96 %  on   ;   Device (Oxygen Therapy): room air  Body mass index is 35.35 kg/m².  Wt Readings from Last 1 Encounters:   06/07/18 0319 125 kg (275 lb 4.8 oz)   06/06/18 0327 125 kg (275 lb 4.8 oz)   06/05/18 0414 125 kg (275 lb 8 oz)    06/04/18 2310 125 kg (275 lb)     Wt Readings from Last 3 Encounters:   06/07/18 125 kg (275 lb 4.8 oz)   12/13/16 125 kg (276 lb)   11/15/16 125 kg (275 lb)       PHYSICAL EXAM:  Constitutional:  Well-developed and well-nourished.  No respiratory distress.      HENT:  Head: Normocephalic and atraumatic.  Mouth:  Moist mucous membranes.    Eyes:  Conjunctivae and EOM are normal.  Pupils are equal, round, and reactive to light.  No scleral icterus.  Neck:  Neck supple.  No JVD present.    Cardiovascular:  Normal rate, regular rhythm and normal heart sounds with no murmur.  Pulmonary/Chest:  No respiratory distress, no wheezes, no crackles, with normal breath sounds and good air movement.  Abdominal:  Soft.  Bowel sounds are normal.  No distension and no tenderness.   Musculoskeletal:  No edema, no tenderness, and no deformity.  No red or swollen joints anywhere.    Neurological:  Alert and oriented to person, place, and time.  No cranial nerve deficit.  No tongue deviation.  No facial droop.  No slurred speech.   Skin:  Skin is warm and dry.  No rash noted.  No pallor.   Peripheral vascular:  No edema and strong pulses on all 4 extremities.  Genitourinary:    DISCHARGE DISPOSITION   Stable    DISCHARGE MEDICATIONS:   Adalberto Llamas   Home Medication Instructions TANIA:[id number]    Printed on:06/07/18 7764   Medication Information                      allopurinol (ZYLOPRIM) 300 MG tablet  Take 300 mg by mouth Every Night.             aspirin 81 MG EC tablet  Take 1 tablet by mouth Daily for 30 days.             atorvastatin (LIPITOR) 40 MG tablet  Take 1 tablet by mouth Daily.             DULoxetine (CYMBALTA) 60 MG capsule  Take 60 mg by mouth At Night As Needed. PRN neuropathic pain             glimepiride (AMARYL) 2 MG tablet  Take 2 mg by mouth Every Night.             HYDROcodone-acetaminophen (NORCO)  MG per tablet  Take 1 tablet by mouth 3 (Three) Times a Day As Needed for Mild Pain .              lisinopril-hydrochlorothiazide (PRINZIDE,ZESTORETIC) 20-12.5 MG per tablet  Take 1 tablet by mouth Every Night.             metFORMIN (GLUCOPHAGE) 1000 MG tablet  Take 1 tablet by mouth 2 (Two) Times a Day. Start the 6/10-because of contrast given here.             metoprolol succinate XL (TOPROL-XL) 50 MG 24 hr tablet  Take 1 tablet by mouth Every Night for 30 days.             nitroglycerin (NITROSTAT) 0.4 MG SL tablet  Place 1 tablet under the tongue Every 5 (Five) Minutes As Needed for Chest Pain. Take no more than 3 doses in 15 minutes.             tamsulosin (FLOMAX) 0.4 MG capsule 24 hr capsule  Take 1 capsule by mouth every night.             ticagrelor (BRILINTA) 90 MG tablet tablet  Take 1 tablet by mouth 2 (Two) Times a Day.                 Diet Instructions     Diet: Consistent Carbohydrate, Cardiac       Discharge Diet:   Consistent Carbohydrate  Cardiac           Activity Instructions     Activity as Directed               Future Appointments  Date Time Provider Department Center   6/13/2018 4:10 PM John Davis MD [facility] None   6/14/2018 2:00 PM Charles Leigh MD [facility] None       TEST  RESULTS PENDING AT DISCHARGE  [unfilled]     CODE STATUS  Prior    Ishmael Ruiz MD  06/07/18  2:36 PM    Please note that this discharge summary required more than 30 minutes to complete.      Your Scheduled Appointments    Jun 13, 2018  4:10 PM EDT  Follow Up with John Davis MD  [facility] CARDIOLOGY (--) [address]  [place] [address] [phone]   Arrive 15 minutes prior to appointment.   Jun 14, 2018  2:00 PM EDT  Follow Up with Charles Leigh MD  [facility] UROLOGY (--) [address]  [place] [address] [phone]   Arrive 15 minutes prior to appointment.    Additional instructions:      PT  HAS  AN  APT  WITH  DR KIRKPATRICK  FOR  June 21  AT  2:15  AND  DR JONES  FOR  June 13  AT  4:20 AND  AN  APT  WITH  DR LEIGH  FOR   June 14  AT 2 PM                  Additional Instructions for the Follow-ups that You Need to Schedule     Discharge Follow-up with PCP    As directed      Follow Up Details:  asap         Discharge Follow-up with Specified Provider: Dr. Davis in one week; 1 Week    As directed      To:  Dr. Davis in one week    Follow Up:  1 Week           Follow-up Information     Fish Delaney MD Follow up.    Specialty:  Internal Medicine  Why:  asap  Contact information:  [address] [facility] [address] [place] [address]  [phone]             John Davis MD Follow up in 1 week(s).    Specialty:  Cardiology  Contact information:  [address] [place] [address]  [phone]             Urology Follow up in 1 day(s).    Contact information:  follow up wiht Urology for the BPH. You need to see them to ensure prostate enlargement is not cancer-do so ASAP. Have PCP refer you ASAP

## 2018-06-13 ENCOUNTER — OFFICE VISIT (OUTPATIENT)
Dept: CARDIOLOGY | Facility: CLINIC | Age: 63
End: 2018-06-13

## 2018-06-13 VITALS
OXYGEN SATURATION: 96 % | SYSTOLIC BLOOD PRESSURE: 123 MMHG | HEIGHT: 74 IN | HEART RATE: 84 BPM | DIASTOLIC BLOOD PRESSURE: 67 MMHG | BODY MASS INDEX: 34.93 KG/M2 | WEIGHT: 272.2 LBS

## 2018-06-13 DIAGNOSIS — I25.10 ASCVD (ARTERIOSCLEROTIC CARDIOVASCULAR DISEASE): Primary | ICD-10-CM

## 2018-06-13 DIAGNOSIS — I10 ESSENTIAL HYPERTENSION: ICD-10-CM

## 2018-06-13 DIAGNOSIS — K21.9 GASTROESOPHAGEAL REFLUX DISEASE WITHOUT ESOPHAGITIS: ICD-10-CM

## 2018-06-13 PROBLEM — E78.5 DYSLIPIDEMIA: Status: ACTIVE | Noted: 2018-06-13

## 2018-06-13 PROCEDURE — 99214 OFFICE O/P EST MOD 30 MIN: CPT | Performed by: INTERNAL MEDICINE

## 2018-06-13 NOTE — PROGRESS NOTES
Fish Delaney MD  Adalberto Llamas  1955 06/13/2018    Patient Active Problem List   Diagnosis   • Liver cirrhosis   • Thrombocytopenia   • B12 deficiency   • Degenerative disc disease, cervical   • Degenerative disc disease, lumbar   • Spinal stenosis in cervical region   • Chest pain   • Other chest pain   • ASCVD (arteriosclerotic cardiovascular disease), status post stenting of the LAD June 5, 2018, clinically stable.   • Dyslipidemia   • Essential hypertension   • GERD (gastroesophageal reflux disease)       Dear Fish Delaney MD:    Subjective     Chief complaint: Follow-up of recent coronary intervention    History of Present Illness: Mr. Llamas is a pleasant 62-year-old  male with recent class III anginal symptoms and severe single-vessel coronary artery disease for which she underwent recent coronary intervention about a week ago.  He is here for follow-up.  He says he's feeling much better.  He does complain of some intermittent heartburn after he eats certain foods.  Otherwise he is feeling good.  He denies any significant dyspnea, PND or orthopnea.      No Known Allergies:      Current Outpatient Prescriptions:   •  allopurinol (ZYLOPRIM) 300 MG tablet, Take 300 mg by mouth Every Night., Disp: , Rfl:   •  aspirin 81 MG EC tablet, Take 1 tablet by mouth Daily for 30 days., Disp: 30 tablet, Rfl: 0  •  atorvastatin (LIPITOR) 40 MG tablet, Take 1 tablet by mouth Daily., Disp: 30 tablet, Rfl: 0  •  DULoxetine (CYMBALTA) 60 MG capsule, Take 60 mg by mouth At Night As Needed. PRN neuropathic pain, Disp: , Rfl:   •  glimepiride (AMARYL) 2 MG tablet, Take 2 mg by mouth Every Night., Disp: , Rfl:   •  HYDROcodone-acetaminophen (NORCO)  MG per tablet, Take 1 tablet by mouth 3 (Three) Times a Day As Needed for Mild Pain ., Disp: , Rfl:   •  lisinopril-hydrochlorothiazide (PRINZIDE,ZESTORETIC) 20-12.5 MG per tablet, Take 1 tablet by mouth Every Night., Disp: , Rfl:   •  metFORMIN (GLUCOPHAGE)  "1000 MG tablet, Take 1 tablet by mouth 2 (Two) Times a Day. Start the 6/10-because of contrast given here., Disp: 60 tablet, Rfl: 0  •  metoprolol succinate XL (TOPROL-XL) 50 MG 24 hr tablet, Take 1 tablet by mouth Every Night for 30 days., Disp: 30 tablet, Rfl: 0  •  nitroglycerin (NITROSTAT) 0.4 MG SL tablet, Place 1 tablet under the tongue Every 5 (Five) Minutes As Needed for Chest Pain. Take no more than 3 doses in 15 minutes., Disp: 30 tablet, Rfl: 12  •  tamsulosin (FLOMAX) 0.4 MG capsule 24 hr capsule, Take 1 capsule by mouth every night., Disp: , Rfl:   •  ticagrelor (BRILINTA) 90 MG tablet tablet, Take 1 tablet by mouth 2 (Two) Times a Day., Disp: 60 tablet, Rfl: 0      The following portions of the patient's history were reviewed and updated as appropriate: allergies, current medications, past family history, past medical history, past social history, past surgical history and problem list.    Social History   Substance Use Topics   • Smoking status: Never Smoker   • Smokeless tobacco: Never Used   • Alcohol use Yes      Comment: Rare       Review of Systems   Cardiovascular: Negative for chest pain, leg swelling, palpitations and syncope.   Respiratory: Negative for shortness of breath.    Neurological: Negative for dizziness.       Objective   Vitals:    06/13/18 1539   BP: 123/67   BP Location: Left arm   Patient Position: Sitting   Cuff Size: Adult   Pulse: 84   SpO2: 96%   Weight: 123 kg (272 lb 3.2 oz)   Height: 188 cm (74\")     Body mass index is 34.95 kg/m².        Physical Exam   Constitutional: He is oriented to person, place, and time. He appears well-developed and well-nourished.   HENT:   Mouth/Throat: Oropharynx is clear and moist.   Eyes: EOM are normal. Pupils are equal, round, and reactive to light.   Neck: Neck supple. No JVD present. No tracheal deviation present. No thyromegaly present.   Cardiovascular: Normal rate, regular rhythm, S1 normal and S2 normal.  Exam reveals no gallop and " no friction rub.    No murmur heard.  Pulmonary/Chest: Effort normal and breath sounds normal.   Abdominal: Soft. Bowel sounds are normal. He exhibits no mass. There is no tenderness.   Musculoskeletal: Normal range of motion. He exhibits no edema.   Lymphadenopathy:     He has no cervical adenopathy.   Neurological: He is alert and oriented to person, place, and time.   Skin: Skin is warm and dry. No rash noted.   Psychiatric: He has a normal mood and affect.       Lab Results   Component Value Date     06/06/2018    K 3.6 06/06/2018     06/06/2018    CO2 22.6 (L) 06/06/2018    BUN 12 06/06/2018    CREATININE 0.74 06/06/2018    GLUCOSE 152 (H) 06/06/2018    CALCIUM 9.4 06/06/2018    AST 23 06/06/2018    ALT 29 06/06/2018    ALKPHOS 66 06/06/2018    LABIL2 1.5 06/06/2018     Lab Results   Component Value Date    CKTOTAL 115 06/05/2018     Lab Results   Component Value Date    WBC 6.38 06/06/2018    HGB 14.9 06/06/2018    HCT 44.0 06/06/2018     (L) 06/06/2018     Lab Results   Component Value Date    INR 0.95 04/23/2016    INR 0.96 04/19/2016     Lab Results   Component Value Date    MG 2.2 06/06/2018     Lab Results   Component Value Date    TSH 0.744 06/05/2018    CHLPL 100 05/05/2014    TRIG 432 (H) 06/05/2018    HDL 28 (L) 06/05/2018    LDL  06/05/2018      Comment:      Unable to calculate      Procedures    Assessment/Plan :    1. ASCVD (arteriosclerotic cardiovascular disease), status post stenting of the LAD June 5, 2018, clinically stable.     2. Essential hypertension     3. Gastroesophageal reflux disease without esophagitis        Recommendations:    1. Continue with current medications.  2. Add ranitidine 150 mg by mouth twice a day for GERD.  3. Follow up in 2 or 3 months.    Return in about 3 months (around 9/13/2018).    As always, I appreciate very much the opportunity to participate in the cardiovascular care of your patients.      With Best Regards,    John Davis MD,  FACC    Dragon disclaimer:  Much of this encounter note is an electronic transcription/translation of spoken language to printed text. The electronic translation of spoken language may permit erroneous, or at times, nonsensical words or phrases to be inadvertently transcribed; Although I have reviewed the note for such errors, some may still exist.

## 2018-06-14 ENCOUNTER — OFFICE VISIT (OUTPATIENT)
Dept: UROLOGY | Facility: CLINIC | Age: 63
End: 2018-06-14

## 2018-06-14 VITALS — BODY MASS INDEX: 34.91 KG/M2 | WEIGHT: 272 LBS | HEIGHT: 74 IN

## 2018-06-14 DIAGNOSIS — N52.9 ERECTILE DYSFUNCTION, UNSPECIFIED ERECTILE DYSFUNCTION TYPE: ICD-10-CM

## 2018-06-14 DIAGNOSIS — N42.9 DISORDER OF PROSTATE: ICD-10-CM

## 2018-06-14 DIAGNOSIS — N20.0 KIDNEY STONE: ICD-10-CM

## 2018-06-14 DIAGNOSIS — N32.89 BLADDER WALL THICKENING: Primary | ICD-10-CM

## 2018-06-14 LAB — PSA SERPL-MCNC: 0.31 NG/ML (ref 0–4)

## 2018-06-14 PROCEDURE — 84153 ASSAY OF PSA TOTAL: CPT | Performed by: UROLOGY

## 2018-06-14 PROCEDURE — 99203 OFFICE O/P NEW LOW 30 MIN: CPT | Performed by: UROLOGY

## 2018-06-14 PROCEDURE — 36415 COLL VENOUS BLD VENIPUNCTURE: CPT | Performed by: UROLOGY

## 2018-06-14 NOTE — PROGRESS NOTES
Chief Complaint:          Follow up from hospitalization.    HPI:   63 y.o. male.  Pt wants to talk about erectile dysfunction.  Pt has not taken any nitroglycerin but he carries it with him.  His Ct scan shows non obstructing stones and bladder wall thickening.  I discussed treatment of erectile dysfunction and the first treatment would be viagra but he needs to confirm that he is healthy enourgh for intercoures and that he can from a medical viewpoint can take viagra.  HPI      Past Medical History:        Past Medical History:   Diagnosis Date   • Arthritis    • B12 deficiency    • Diabetes mellitus    • Gout    • Hyperlipidemia    • Hypertension    • Liver cirrhosis    • Low back pain    • Myocardial infarction    • Peripheral neuropathy    • Thrombocytopenia          Current Meds:     Current Outpatient Prescriptions   Medication Sig Dispense Refill   • allopurinol (ZYLOPRIM) 300 MG tablet Take 300 mg by mouth Every Night.     • aspirin 81 MG EC tablet Take 1 tablet by mouth Daily for 30 days. 30 tablet 0   • atorvastatin (LIPITOR) 40 MG tablet Take 1 tablet by mouth Daily. 30 tablet 0   • DULoxetine (CYMBALTA) 60 MG capsule Take 60 mg by mouth At Night As Needed. PRN neuropathic pain     • glimepiride (AMARYL) 2 MG tablet Take 2 mg by mouth Every Night.     • HYDROcodone-acetaminophen (NORCO)  MG per tablet Take 1 tablet by mouth 3 (Three) Times a Day As Needed for Mild Pain .     • lisinopril-hydrochlorothiazide (PRINZIDE,ZESTORETIC) 20-12.5 MG per tablet Take 1 tablet by mouth Every Night.     • metFORMIN (GLUCOPHAGE) 1000 MG tablet Take 1 tablet by mouth 2 (Two) Times a Day. Start the 6/10-because of contrast given here. 60 tablet 0   • metoprolol succinate XL (TOPROL-XL) 50 MG 24 hr tablet Take 1 tablet by mouth Every Night for 30 days. 30 tablet 0   • nitroglycerin (NITROSTAT) 0.4 MG SL tablet Place 1 tablet under the tongue Every 5 (Five) Minutes As Needed for Chest Pain. Take no more than 3 doses  in 15 minutes. 30 tablet 12   • tamsulosin (FLOMAX) 0.4 MG capsule 24 hr capsule Take 1 capsule by mouth every night.     • ticagrelor (BRILINTA) 90 MG tablet tablet Take 1 tablet by mouth 2 (Two) Times a Day. 60 tablet 0     No current facility-administered medications for this visit.         Allergies:      No Known Allergies     Past Surgical History:     Past Surgical History:   Procedure Laterality Date   • CARDIAC CATHETERIZATION N/A 6/6/2018    Procedure: Left Heart Cath;  Surgeon: Jax Chappell MD;  Location:  [facility];  Service: Cardiovascular   • CARPAL TUNNEL RELEASE Bilateral     Dr. Juan   • CHOLECYSTECTOMY     • MS RT/LT HEART CATHETERS N/A 6/6/2018    Procedure: Percutaneous Coronary Intervention;  Surgeon: Jax Chappell MD;  Location:  [facility];  Service: Cardiovascular   • TONSILLECTOMY           Social History:     Social History     Social History   • Marital status:      Spouse name: N/A   • Number of children: N/A   • Years of education: N/A     Occupational History   • Not on file.     Social History Main Topics   • Smoking status: Never Smoker   • Smokeless tobacco: Never Used   • Alcohol use Yes      Comment: Rare   • Drug use: No   • Sexual activity: Not on file     Other Topics Concern   • Not on file     Social History Narrative   • No narrative on file       Family History:     Family History   Problem Relation Age of Onset   • Heart disease Mother    • Cancer Mother    • Cancer Father        Review of Systems:     Review of Systems   Constitutional: Negative for chills, fatigue and fever.   HENT: Negative for congestion and sinus pressure.    Respiratory: Negative for shortness of breath.    Cardiovascular: Negative for chest pain.   Gastrointestinal: Negative for abdominal pain, constipation, diarrhea, nausea and vomiting.   Genitourinary: Negative for frequency and urgency.   Musculoskeletal: Positive for back pain. Negative for neck  "pain.   Neurological: Negative for dizziness and headaches.   Hematological: Bruises/bleeds easily.   Psychiatric/Behavioral: The patient is not nervous/anxious.            I have reviewed the follow portions of the patient's history and confirmed they are accurate today:  allergies, current medications, past family history, past medical history, past social history, past surgical history, problem list and ROS  Physical Exam:     Physical Exam   Constitutional: He is oriented to person, place, and time.   Morbidly obese   HENT:   Head: Normocephalic and atraumatic.   Right Ear: External ear normal.   Left Ear: External ear normal.   Nose: Nose normal.   Mouth/Throat: Oropharynx is clear and moist.   Eyes: Conjunctivae and EOM are normal. Pupils are equal, round, and reactive to light.   Neck: Normal range of motion. Neck supple. No thyromegaly present.   Cardiovascular: Normal rate, regular rhythm, normal heart sounds and intact distal pulses.    No murmur heard.  Pulmonary/Chest: Effort normal and breath sounds normal. No respiratory distress. He has no wheezes. He has no rales. He exhibits no tenderness.   Abdominal: Soft. Bowel sounds are normal. He exhibits no distension and no mass. There is no tenderness. No hernia.   Genitourinary: Rectum normal, prostate normal and penis normal.   Genitourinary Comments: enlarged firm prostate without nodules   Musculoskeletal: Normal range of motion. He exhibits no edema or tenderness.   Lymphadenopathy:     He has no cervical adenopathy.   Neurological: He is alert and oriented to person, place, and time. No cranial nerve deficit. He exhibits normal muscle tone. Coordination normal.   Skin: Skin is warm. No rash noted.   Psychiatric: He has a normal mood and affect. His behavior is normal. Judgment and thought content normal.   Nursing note and vitals reviewed.      Ht 188 cm (74.02\")   Wt 123 kg (272 lb)   BMI 34.91 kg/m²    Procedure:         Assessment:     Encounter " Diagnoses   Name Primary?   • Bladder wall thickening Yes   • Kidney stone    • Disorder of prostate    • Erectile dysfunction, unspecified erectile dysfunction type      Orders Placed This Encounter   Procedures   • PSA DIAGNOSTIC     Standing Status:   Future     Standing Expiration Date:   6/14/2019       Plan:   Will order a psa today.  Will schedule pt for cystoscopy.  Pt will ask cardiologist if he is healthy enough for sex and if he can get rid of his nitroglycerin.  I discussed his morbid obesity and treatments for this.    Patient's Body mass index is 34.91 kg/m². BMI is above normal parameters. Recommendations include: educational material.      Counseling was given to patient for the following topics impressions as follows: bladder wall thickening and ed. The interim medical history and current results were reviewed.  A treatment plan with follow-up was made for Bladder wall thickening [N32.89].   This document has been electronically signed by Charles Leigh MD June 14, 2018 2:44 PM

## 2018-06-19 ENCOUNTER — TELEPHONE (OUTPATIENT)
Dept: CARDIAC REHAB | Facility: HOSPITAL | Age: 63
End: 2018-06-19

## 2018-06-29 ENCOUNTER — DOCUMENTATION (OUTPATIENT)
Dept: CARDIAC REHAB | Facility: HOSPITAL | Age: 63
End: 2018-06-29

## 2018-06-29 NOTE — PROGRESS NOTES
Spoke with patient about cardiac rehab and he stated he could not afford the co pay at this time.  I told him if he changed his mind call us back and we would gladly schedule him

## 2018-07-18 ENCOUNTER — EPISODE CHANGES (OUTPATIENT)
Dept: CASE MANAGEMENT | Facility: OTHER | Age: 63
End: 2018-07-18

## 2018-07-26 ENCOUNTER — PROCEDURE VISIT (OUTPATIENT)
Dept: UROLOGY | Facility: CLINIC | Age: 63
End: 2018-07-26

## 2018-07-26 VITALS — WEIGHT: 272 LBS | HEIGHT: 74 IN | BODY MASS INDEX: 34.91 KG/M2

## 2018-07-26 DIAGNOSIS — R39.12 BENIGN PROSTATIC HYPERPLASIA WITH WEAK URINARY STREAM: ICD-10-CM

## 2018-07-26 DIAGNOSIS — N39.0 URINARY TRACT INFECTION WITHOUT HEMATURIA, SITE UNSPECIFIED: Primary | ICD-10-CM

## 2018-07-26 DIAGNOSIS — N40.1 BENIGN PROSTATIC HYPERPLASIA WITH WEAK URINARY STREAM: ICD-10-CM

## 2018-07-26 LAB
BILIRUB BLD-MCNC: NEGATIVE MG/DL
CLARITY, POC: CLEAR
COLOR UR: YELLOW
GLUCOSE UR STRIP-MCNC: NEGATIVE MG/DL
KETONES UR QL: NEGATIVE
LEUKOCYTE EST, POC: NEGATIVE
NITRITE UR-MCNC: NEGATIVE MG/ML
PH UR: 5 [PH] (ref 5–8)
PROT UR STRIP-MCNC: NEGATIVE MG/DL
RBC # UR STRIP: NEGATIVE /UL
SP GR UR: 1.03 (ref 1–1.03)
UROBILINOGEN UR QL: NORMAL

## 2018-07-26 PROCEDURE — 81003 URINALYSIS AUTO W/O SCOPE: CPT | Performed by: UROLOGY

## 2018-07-26 PROCEDURE — 52000 CYSTOURETHROSCOPY: CPT | Performed by: UROLOGY

## 2018-07-26 NOTE — PROGRESS NOTES
Chief Complaint:          Bladder wall thickening    HPI:   63 y.o. male.    HPI  Pt denies slow stream or intermittent stream.  He has nocturia x2   His psa is 0.3    Past Medical History:        Past Medical History:   Diagnosis Date   • Arthritis    • B12 deficiency    • Diabetes mellitus (CMS/HCC)    • Gout    • Hyperlipidemia    • Hypertension    • Liver cirrhosis (CMS/HCC)    • Low back pain    • Myocardial infarction    • Peripheral neuropathy    • Thrombocytopenia (CMS/HCC)          Current Meds:     Current Outpatient Prescriptions   Medication Sig Dispense Refill   • allopurinol (ZYLOPRIM) 300 MG tablet Take 300 mg by mouth Every Night.     • atorvastatin (LIPITOR) 40 MG tablet Take 1 tablet by mouth Daily. 30 tablet 0   • DULoxetine (CYMBALTA) 60 MG capsule Take 60 mg by mouth At Night As Needed. PRN neuropathic pain     • glimepiride (AMARYL) 2 MG tablet Take 2 mg by mouth Every Night.     • HYDROcodone-acetaminophen (NORCO)  MG per tablet Take 1 tablet by mouth 3 (Three) Times a Day As Needed for Mild Pain .     • lisinopril-hydrochlorothiazide (PRINZIDE,ZESTORETIC) 20-12.5 MG per tablet Take 1 tablet by mouth Every Night.     • metFORMIN (GLUCOPHAGE) 1000 MG tablet Take 1 tablet by mouth 2 (Two) Times a Day. Start the 6/10-because of contrast given here. 60 tablet 0   • nitroglycerin (NITROSTAT) 0.4 MG SL tablet Place 1 tablet under the tongue Every 5 (Five) Minutes As Needed for Chest Pain. Take no more than 3 doses in 15 minutes. 30 tablet 12   • tamsulosin (FLOMAX) 0.4 MG capsule 24 hr capsule Take 1 capsule by mouth every night.     • ticagrelor (BRILINTA) 90 MG tablet tablet Take 1 tablet by mouth 2 (Two) Times a Day. 60 tablet 0     No current facility-administered medications for this visit.         Allergies:      No Known Allergies     Past Surgical History:     Past Surgical History:   Procedure Laterality Date   • CARDIAC CATHETERIZATION N/A 6/6/2018    Procedure: Left Heart Cath;   Surgeon: Jax Chappell MD;  Location:  [facility];  Service: Cardiovascular   • CARPAL TUNNEL RELEASE Bilateral     Dr. Juan   • CHOLECYSTECTOMY     • [place] RT/LT HEART CATHETERS N/A 6/6/2018    Procedure: Percutaneous Coronary Intervention;  Surgeon: Jax Chappell MD;  Location:  [facility];  Service: Cardiovascular   • TONSILLECTOMY           Social History:     Social History     Social History   • Marital status:      Spouse name: N/A   • Number of children: N/A   • Years of education: N/A     Occupational History   • Not on file.     Social History Main Topics   • Smoking status: Never Smoker   • Smokeless tobacco: Never Used   • Alcohol use Yes      Comment: Rare   • Drug use: No   • Sexual activity: Not on file     Other Topics Concern   • Not on file     Social History Narrative   • No narrative on file       Family History:     Family History   Problem Relation Age of Onset   • Heart disease Mother    • Cancer Mother    • Cancer Father        Review of Systems:     Review of Systems    I have reviewed the follow portions of the patient's history and confirmed they are accurate today:  allergies, current medications, past family history, past medical history, past social history, past surgical history, problem list and ROS  Physical Exam:     Physical Exam   Constitutional: He is oriented to person, place, and time.   Morbidly obese   HENT:   Head: Normocephalic and atraumatic.   Right Ear: External ear normal.   Left Ear: External ear normal.   Nose: Nose normal.   Mouth/Throat: Oropharynx is clear and moist.   Eyes: Pupils are equal, round, and reactive to light. Conjunctivae and EOM are normal.   Neck: Normal range of motion. Neck supple. No thyromegaly present.   Cardiovascular: Normal rate, regular rhythm, normal heart sounds and intact distal pulses.    No murmur heard.  Pulmonary/Chest: Effort normal and breath sounds normal. No respiratory distress. He  "has no wheezes. He has no rales. He exhibits no tenderness.   Abdominal: Soft. Bowel sounds are normal. He exhibits no distension and no mass. There is no tenderness. No hernia.   Genitourinary: Rectum normal, prostate normal and penis normal.   Genitourinary Comments: Moderately enlarged without  nodues.   Musculoskeletal: Normal range of motion. He exhibits no edema or tenderness.   Lymphadenopathy:     He has no cervical adenopathy.   Neurological: He is alert and oriented to person, place, and time. No cranial nerve deficit. He exhibits normal muscle tone. Coordination normal.   Skin: Skin is warm. No rash noted.   Psychiatric: He has a normal mood and affect. His behavior is normal. Judgment and thought content normal.   Nursing note and vitals reviewed.      Ht 188 cm (74.02\")   Wt 123 kg (272 lb)   BMI 34.91 kg/m²    Procedure:     Procedure: Cystoscopy Male    Indication: bladder wall thickening.    Urinalysis Performed Today:  Negative for Infection    Informed Consent Obtained    Sterile prep performed in usual fashion    11 cc of topical lidocaine inserted urethrally for 10 minutes    Flexible cystoscope inserted and bladder examined    Findings: Pt has BPH with no tumors or stones in bladder.    Additional Procedure with Cystoscopy: none      .    Assessment:     Encounter Diagnoses   Name Primary?   • Urinary tract infection without hematuria, site unspecified Yes   • Benign prostatic hyperplasia with weak urinary stream      Orders Placed This Encounter   Procedures   • POC Urinalysis Dipstick, Automated       Plan:   If pt becomes symptomatic he may benefit from finasteride.  His bph is the reason for bladder wall thickening    Patient's Body mass index is 34.91 kg/m². BMI is above normal parameters. Recommendations include: educational material.      Counseling was given to patient for the following topics diagnostic results including: bladder wall thickening on cystoscopy from bph. The interim " medical history and current results were reviewed.  A treatment plan with follow-up was made for Urinary tract infection without hematuria, site unspecified [N39.0].This document has been electronically signed by Charles Leigh MD July 26, 2018 1:24 PM

## 2018-08-23 ENCOUNTER — EPISODE CHANGES (OUTPATIENT)
Dept: CASE MANAGEMENT | Facility: OTHER | Age: 63
End: 2018-08-23

## 2018-10-09 ENCOUNTER — OFFICE VISIT (OUTPATIENT)
Dept: CARDIOLOGY | Facility: CLINIC | Age: 63
End: 2018-10-09

## 2018-10-09 VITALS
DIASTOLIC BLOOD PRESSURE: 67 MMHG | HEIGHT: 74 IN | BODY MASS INDEX: 34.8 KG/M2 | HEART RATE: 68 BPM | WEIGHT: 271.2 LBS | OXYGEN SATURATION: 94 % | SYSTOLIC BLOOD PRESSURE: 129 MMHG

## 2018-10-09 DIAGNOSIS — I25.10 ASCVD (ARTERIOSCLEROTIC CARDIOVASCULAR DISEASE): Primary | ICD-10-CM

## 2018-10-09 DIAGNOSIS — E78.5 DYSLIPIDEMIA: ICD-10-CM

## 2018-10-09 DIAGNOSIS — I10 ESSENTIAL HYPERTENSION: ICD-10-CM

## 2018-10-09 DIAGNOSIS — R06.09 DYSPNEA ON EXERTION: ICD-10-CM

## 2018-10-09 PROCEDURE — 99214 OFFICE O/P EST MOD 30 MIN: CPT | Performed by: INTERNAL MEDICINE

## 2018-10-09 RX ORDER — METOPROLOL SUCCINATE 50 MG/1
50 TABLET, EXTENDED RELEASE ORAL DAILY
Qty: 30 TABLET | Refills: 5 | Status: SHIPPED | OUTPATIENT
Start: 2018-10-09 | End: 2019-10-17 | Stop reason: SDUPTHER

## 2018-10-09 RX ORDER — METOPROLOL SUCCINATE 50 MG/1
50 TABLET, EXTENDED RELEASE ORAL DAILY
COMMUNITY
End: 2018-10-09 | Stop reason: SDUPTHER

## 2018-10-09 RX ORDER — ASPIRIN 81 MG/1
81 TABLET ORAL DAILY
Qty: 30 TABLET | Refills: 5 | Status: SHIPPED | OUTPATIENT
Start: 2018-10-09

## 2018-10-09 RX ORDER — ATORVASTATIN CALCIUM 40 MG/1
40 TABLET, FILM COATED ORAL DAILY
Qty: 30 TABLET | Refills: 5 | Status: SHIPPED | OUTPATIENT
Start: 2018-10-09 | End: 2019-10-17 | Stop reason: SDUPTHER

## 2018-10-09 RX ORDER — LISINOPRIL AND HYDROCHLOROTHIAZIDE 20; 12.5 MG/1; MG/1
1 TABLET ORAL NIGHTLY
Qty: 30 TABLET | Refills: 5 | Status: SHIPPED | OUTPATIENT
Start: 2018-10-09 | End: 2018-10-17 | Stop reason: SDUPTHER

## 2018-10-09 RX ORDER — ASPIRIN 81 MG/1
81 TABLET ORAL DAILY
COMMUNITY
End: 2018-10-09 | Stop reason: SDUPTHER

## 2018-10-09 NOTE — PROGRESS NOTES
Fish Delaney MD  Adalberto Llamas  1955  10/09/2018    Patient Active Problem List   Diagnosis   • Liver cirrhosis (CMS/HCC)   • Thrombocytopenia (CMS/HCC)   • B12 deficiency   • Degenerative disc disease, cervical   • Degenerative disc disease, lumbar   • Spinal stenosis in cervical region   • Chest pain   • Other chest pain   • ASCVD (arteriosclerotic cardiovascular disease), status post stenting of the LAD June 5, 2018, clinically stable.   • Dyslipidemia   • Essential hypertension   • GERD (gastroesophageal reflux disease)   • Dyspnea on exertion       Dear Fish Delaney MD:    Subjective     Adalberto Llamas is a 63 y.o. male with the problems as listed above, presents    Chief complaint: Follow-up of ASCVD, status post stenting    History of Present Illness: Mr. Llamas is a pleasant 63-year-old  male with history of known ASCVD, status post stenting of severe stenosis in the first diagonal branch of the LAD in June 2018.  He is here for a cardiology follow-up.  He denies any complaints of chest pains since the stenting.  However he has dyspnea with mild exertion with no PND, orthopnea or pedal edema.  He has been a nonsmoker for about 25 years.  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  He has no history of known lung disease.  His recent echo Doppler study revealed normal LV systolic function with an estimated LV ejection fraction of about 60-65% with no significant valvular abnormalities noted.    No Known Allergies:      Current Outpatient Prescriptions:   •  allopurinol (ZYLOPRIM) 300 MG tablet, Take 300 mg by mouth Every Night., Disp: , Rfl:   •  aspirin 81 MG EC tablet, Take 1 tablet by mouth Daily., Disp: 30 tablet, Rfl: 5  •  atorvastatin (LIPITOR) 40 MG tablet, Take 1 tablet by mouth Daily., Disp: 30 tablet, Rfl: 5  •  DULoxetine (CYMBALTA) 60 MG capsule, Take 60 mg by mouth At Night As Needed. PRN neuropathic pain, Disp: , Rfl:   •  glimepiride (AMARYL) 2 MG  "tablet, Take 2 mg by mouth Every Night., Disp: , Rfl:   •  HYDROcodone-acetaminophen (NORCO)  MG per tablet, Take 1 tablet by mouth 3 (Three) Times a Day As Needed for Mild Pain ., Disp: , Rfl:   •  lisinopril-hydrochlorothiazide (PRINZIDE,ZESTORETIC) 20-12.5 MG per tablet, Take 1 tablet by mouth Every Night., Disp: 30 tablet, Rfl: 5  •  metFORMIN (GLUCOPHAGE) 1000 MG tablet, Take 1 tablet by mouth 2 (Two) Times a Day. Start the 6/10-because of contrast given here., Disp: 60 tablet, Rfl: 0  •  metoprolol succinate XL (TOPROL-XL) 50 MG 24 hr tablet, Take 1 tablet by mouth Daily., Disp: 30 tablet, Rfl: 5  •  nitroglycerin (NITROSTAT) 0.4 MG SL tablet, Place 1 tablet under the tongue Every 5 (Five) Minutes As Needed for Chest Pain. Take no more than 3 doses in 15 minutes., Disp: 30 tablet, Rfl: 12  •  tamsulosin (FLOMAX) 0.4 MG capsule 24 hr capsule, Take 1 capsule by mouth every night., Disp: , Rfl:   •  ticagrelor (BRILINTA) 90 MG tablet tablet, Take 1 tablet by mouth 2 (Two) Times a Day., Disp: 60 tablet, Rfl: 0      The following portions of the patient's history were reviewed and updated as appropriate: allergies, current medications, past family history, past medical history, past social history, past surgical history and problem list.    Social History   Substance Use Topics   • Smoking status: Never Smoker   • Smokeless tobacco: Never Used   • Alcohol use Yes      Comment: Rare       Review of Systems   Cardiovascular: Negative for chest pain, leg swelling, palpitations and syncope.   Respiratory: Positive for shortness of breath.    Neurological: Positive for dizziness.       Objective   Vitals:    10/09/18 1213   BP: 129/67   BP Location: Right arm   Patient Position: Sitting   Cuff Size: Adult   Pulse: 68   SpO2: 94%   Weight: 123 kg (271 lb 3.2 oz)   Height: 188 cm (74\")     Body mass index is 34.82 kg/m².        Physical Exam   Constitutional: He is oriented to person, place, and time. He appears " well-developed and well-nourished.   HENT:   Mouth/Throat: Oropharynx is clear and moist.   Eyes: Pupils are equal, round, and reactive to light. EOM are normal.   Neck: Neck supple. No JVD present. No tracheal deviation present. No thyromegaly present.   Cardiovascular: Normal rate, regular rhythm, S1 normal and S2 normal.  Exam reveals no gallop and no friction rub.    No murmur heard.  Pulmonary/Chest: Effort normal and breath sounds normal.   Abdominal: Soft. Bowel sounds are normal. He exhibits no mass. There is no tenderness.   Musculoskeletal: Normal range of motion. He exhibits no edema.   Lymphadenopathy:     He has no cervical adenopathy.   Neurological: He is alert and oriented to person, place, and time.   Skin: Skin is warm and dry. No rash noted.   Psychiatric: He has a normal mood and affect.       Lab Results   Component Value Date     06/06/2018    K 3.6 06/06/2018     06/06/2018    CO2 22.6 (L) 06/06/2018    BUN 12 06/06/2018    CREATININE 0.74 06/06/2018    GLUCOSE 152 (H) 06/06/2018    CALCIUM 9.4 06/06/2018    AST 23 06/06/2018    ALT 29 06/06/2018    ALKPHOS 66 06/06/2018    LABIL2 1.6 04/23/2016     Lab Results   Component Value Date    CKTOTAL 115 06/05/2018     Lab Results   Component Value Date    WBC 6.38 06/06/2018    HGB 14.9 06/06/2018    HCT 44.0 06/06/2018     (L) 06/06/2018     Lab Results   Component Value Date    INR 0.95 04/23/2016    INR 0.96 04/19/2016     Lab Results   Component Value Date    MG 2.2 06/06/2018     Lab Results   Component Value Date    TSH 0.744 06/05/2018    PSA 0.310 06/14/2018    CHLPL 100 05/05/2014    TRIG 432 (H) 06/05/2018    HDL 28 (L) 06/05/2018    LDL  06/05/2018      Comment:      Unable to calculate      Adalberto Llamas   Echocardiogram   Order# [id number]   Reading physician:   John Davis MD Ordering physician:   Gustavo Martin MD Study date: 6/5/18   Patient Information     Patient Name  Adalberto Llamas MRN  [id number]  Sex  Male  (Age)  1955 (63 y.o.)   Interpretation Summary     · Normal left ventricular cavity size and wall thickness noted. All left ventricular wall segments contract normally.  · Estimated EF appears to be in the range of 61 - 65%.  · The valve appears trileaflet. The aortic valve is abnormal in structure. There is moderate calcification of the aortic valve mainly affecting the non, left and right coronary cusp(s).Trace aortic valve regurgitation is present. Mild aortic valve stenosis is present.  · The mitral valve is grossly normal in structure. Mild mitral valve regurgitation is present. No significant mitral valve stenosis is present.  · The tricuspid valve is normal. No tricuspid valve stenosis is present. No tricuspid valve regurgitation is present.  · There is no evidence of pericardial effusion.          Procedures    Assessment/Plan    Diagnosis Plan   1. ASCVD (arteriosclerotic cardiovascular disease), status post stenting of the LAD 2018, clinically stable.     2. Essential hypertension, controlled.      3. Dyspnea on exertion  Full Pulmonary Function Test With Bronchodilator   4. Dyslipidemia          Recommendations:  1. Continue with aspirin, atorvastatin and Ticagrelor.  2. Continue metoprolol XL.   3. For his dyspnea, we'll evaluate for any evidence of lung disease with pulmonary function tests.    Return in about 2 months (around 2018).    As always, I appreciate very much the opportunity to participate in the cardiovascular care of your patients.      With Best Regards,    John Davis MD, [facility]    Dragon disclaimer:  Much of this encounter note is an electronic transcription/translation of spoken language to printed text. The electronic translation of spoken language may permit erroneous, or at times, nonsensical words or phrases to be inadvertently transcribed; Although I have reviewed the note for such errors, some may still exist.

## 2018-10-16 ENCOUNTER — HOSPITAL ENCOUNTER (OUTPATIENT)
Dept: RESPIRATORY THERAPY | Facility: HOSPITAL | Age: 63
Discharge: HOME | End: 2018-10-16
Attending: INTERNAL MEDICINE | Admitting: INTERNAL MEDICINE

## 2018-10-16 ENCOUNTER — TELEPHONE (OUTPATIENT)
Dept: CARDIOLOGY | Facility: CLINIC | Age: 63
End: 2018-10-16

## 2018-10-16 DIAGNOSIS — R06.09 DYSPNEA ON EXERTION: ICD-10-CM

## 2018-10-16 PROCEDURE — 94060 EVALUATION OF WHEEZING: CPT | Performed by: INTERNAL MEDICINE

## 2018-10-16 PROCEDURE — 94727 GAS DIL/WSHOT DETER LNG VOL: CPT | Performed by: INTERNAL MEDICINE

## 2018-10-16 PROCEDURE — 94727 GAS DIL/WSHOT DETER LNG VOL: CPT

## 2018-10-16 PROCEDURE — 94060 EVALUATION OF WHEEZING: CPT

## 2018-10-16 PROCEDURE — 94729 DIFFUSING CAPACITY: CPT | Performed by: INTERNAL MEDICINE

## 2018-10-16 PROCEDURE — 94729 DIFFUSING CAPACITY: CPT

## 2018-10-16 NOTE — TELEPHONE ENCOUNTER
"Pt was in office for appt today and forgot to ask Dr. Davis a medication question.  Pt has taken Lisinopril/HCTZ 20-12.5 mg twice daily for some time now.  The directions on pt's current bottle state to take twice daily.  He is asking why the hospital d/c instructions/summary says to take it once daily, as does his AVS from today.  Pt says he has been taking it twice daily for years and his BP has been running \"good with it.  He is asking for a new twice daily Rx to be sent to pharmacy.   "

## 2018-10-17 RX ORDER — LISINOPRIL AND HYDROCHLOROTHIAZIDE 20; 12.5 MG/1; MG/1
1 TABLET ORAL 2 TIMES DAILY
Qty: 60 TABLET | Refills: 5 | Status: SHIPPED | OUTPATIENT
Start: 2018-10-17

## 2018-10-17 NOTE — TELEPHONE ENCOUNTER
Per Dr. Davis, ok to send in Lisinopril -HCTZ 20-12.5 mg po bid, as pt's kidney ftn appears to be doing well per reviewing last labs.     Tried to call pt to make him aware of this new Rx being sent to pharmacy.  No answer.  Left  requesting callback.

## 2018-10-19 ENCOUNTER — TELEPHONE (OUTPATIENT)
Dept: CARDIOLOGY | Facility: CLINIC | Age: 63
End: 2018-10-19

## 2018-10-19 NOTE — TELEPHONE ENCOUNTER
Pt wanting to know if we could prescribe him MiraLAX.  Let pt know that since this is not a cardiac medication, our providers would most likely prefer his PCP to take care of this.  Pt v/u.  Pt then asks if it is ok to take it from a heart standpoint?

## 2018-10-26 ENCOUNTER — TELEPHONE (OUTPATIENT)
Dept: CARDIOLOGY | Facility: CLINIC | Age: 63
End: 2018-10-26

## 2018-10-26 NOTE — TELEPHONE ENCOUNTER
Results have not been interpreted by pulmonlogist but looking at the values myself it looks to be normal. If pulmonologist says anything different we will let him know.

## 2018-10-29 ENCOUNTER — TELEPHONE (OUTPATIENT)
Dept: CARDIOLOGY | Facility: CLINIC | Age: 63
End: 2018-10-29

## 2018-10-29 DIAGNOSIS — R06.09 DYSPNEA ON EXERTION: Primary | ICD-10-CM

## 2018-10-29 NOTE — TELEPHONE ENCOUNTER
"Patient presented to office front window concerned about recent PFT findings and the fact he was still experiencing shortness of breath in excess. Also in further discussion with him, he states these symptoms seem to have began when he began taking \"Brillinta\".     When this information given to Dr. Davis and after he reviewed PFT findings, I was instructed to advise patient these findings appear normal, however we will give patient an appt time for tomorrow 10/30/2018 to come back to office for visit and discussion with Dr. Davis. Patient verb understanding and agreed.  "

## 2018-10-30 ENCOUNTER — OFFICE VISIT (OUTPATIENT)
Dept: CARDIOLOGY | Facility: CLINIC | Age: 63
End: 2018-10-30

## 2018-10-30 VITALS
HEIGHT: 74 IN | OXYGEN SATURATION: 98 % | WEIGHT: 265.8 LBS | BODY MASS INDEX: 34.11 KG/M2 | DIASTOLIC BLOOD PRESSURE: 68 MMHG | SYSTOLIC BLOOD PRESSURE: 140 MMHG | HEART RATE: 85 BPM | RESPIRATION RATE: 16 BRPM

## 2018-10-30 DIAGNOSIS — I10 ESSENTIAL HYPERTENSION: ICD-10-CM

## 2018-10-30 DIAGNOSIS — I25.10 ASCVD (ARTERIOSCLEROTIC CARDIOVASCULAR DISEASE): ICD-10-CM

## 2018-10-30 DIAGNOSIS — R06.09 DYSPNEA ON EXERTION: ICD-10-CM

## 2018-10-30 DIAGNOSIS — E78.5 DYSLIPIDEMIA: ICD-10-CM

## 2018-10-30 PROCEDURE — 99214 OFFICE O/P EST MOD 30 MIN: CPT | Performed by: INTERNAL MEDICINE

## 2018-10-30 PROCEDURE — 93000 ELECTROCARDIOGRAM COMPLETE: CPT | Performed by: INTERNAL MEDICINE

## 2018-10-30 RX ORDER — CLOPIDOGREL BISULFATE 75 MG/1
75 TABLET ORAL DAILY
Qty: 30 TABLET | Refills: 11 | Status: SHIPPED | OUTPATIENT
Start: 2018-10-30 | End: 2019-07-15

## 2018-10-30 NOTE — PROGRESS NOTES
Fish Delaney MD  Adalberto Llamas  1955  10/30/2018    Patient Active Problem List   Diagnosis   • Liver cirrhosis (CMS/HCC)   • Thrombocytopenia (CMS/HCC)   • B12 deficiency   • Degenerative disc disease, cervical   • Degenerative disc disease, lumbar   • Spinal stenosis in cervical region   • Chest pain   • Other chest pain   • ASCVD (arteriosclerotic cardiovascular disease), status post stenting of the LAD June 5, 2018, clinically stable.   • Dyslipidemia   • Essential hypertension   • GERD (gastroesophageal reflux disease)   • Dyspnea on exertion       Dear Fish Delaney MD:    Subjective     Adalberto Llamas is a 63 y.o. male with the problems as listed above, presents    Chief complaint: Smothering since he has been on Brilinta.    History of Present Illness: Mr. Llamas is a pleasant 63-year-old  male with known ASCVD, status post stenting of the LAD June 2018, has been complaining of shortness of breath since his been taking Brilinta.  He recently had PFTs which were unremarkable for any obstructive lung disease.  He denies any complaints of chest pains.  Otherwise he feels fine.  His main symptom is being short of breath.    No Known Allergies:      Current Outpatient Prescriptions:   •  allopurinol (ZYLOPRIM) 300 MG tablet, Take 300 mg by mouth Every Night., Disp: , Rfl:   •  aspirin 81 MG EC tablet, Take 1 tablet by mouth Daily., Disp: 30 tablet, Rfl: 5  •  atorvastatin (LIPITOR) 40 MG tablet, Take 1 tablet by mouth Daily., Disp: 30 tablet, Rfl: 5  •  DULoxetine (CYMBALTA) 60 MG capsule, Take 60 mg by mouth At Night As Needed. PRN neuropathic pain, Disp: , Rfl:   •  glimepiride (AMARYL) 2 MG tablet, Take 2 mg by mouth Every Night., Disp: , Rfl:   •  HYDROcodone-acetaminophen (NORCO)  MG per tablet, Take 1 tablet by mouth 3 (Three) Times a Day As Needed for Mild Pain ., Disp: , Rfl:   •  lisinopril-hydrochlorothiazide (PRINZIDE,ZESTORETIC) 20-12.5 MG per tablet, Take 1 tablet by mouth 2  "(Two) Times a Day., Disp: 60 tablet, Rfl: 5  •  metFORMIN (GLUCOPHAGE) 1000 MG tablet, Take 1 tablet by mouth 2 (Two) Times a Day. Start the 6/10-because of contrast given here., Disp: 60 tablet, Rfl: 0  •  metoprolol succinate XL (TOPROL-XL) 50 MG 24 hr tablet, Take 1 tablet by mouth Daily., Disp: 30 tablet, Rfl: 5  •  nitroglycerin (NITROSTAT) 0.4 MG SL tablet, Place 1 tablet under the tongue Every 5 (Five) Minutes As Needed for Chest Pain. Take no more than 3 doses in 15 minutes., Disp: 30 tablet, Rfl: 12  •  tamsulosin (FLOMAX) 0.4 MG capsule 24 hr capsule, Take 1 capsule by mouth every night., Disp: , Rfl:   •  clopidogrel (PLAVIX) 75 MG tablet, Take 1 tablet by mouth Daily., Disp: 30 tablet, Rfl: 11      The following portions of the patient's history were reviewed and updated as appropriate: allergies, current medications, past family history, past medical history, past social history, past surgical history and problem list.    Social History   Substance Use Topics   • Smoking status: Never Smoker   • Smokeless tobacco: Never Used   • Alcohol use Yes      Comment: Rare       Review of Systems   Cardiovascular: Negative for chest pain, leg swelling, palpitations and syncope.   Respiratory: Positive for shortness of breath.    Neurological: Negative for dizziness.       Objective   Vitals:    10/30/18 1259   BP: 140/68   BP Location: Right arm   Pulse: 85   Resp: 16   SpO2: 98%   Weight: 121 kg (265 lb 12.8 oz)   Height: 188 cm (74.02\")     Body mass index is 34.11 kg/m².        Physical Exam   Constitutional: He is oriented to person, place, and time. He appears well-developed and well-nourished.   HENT:   Mouth/Throat: Oropharynx is clear and moist.   Eyes: Pupils are equal, round, and reactive to light. EOM are normal.   Neck: Neck supple. No JVD present. No tracheal deviation present. No thyromegaly present.   Cardiovascular: Normal rate, regular rhythm, S1 normal and S2 normal.  Exam reveals no gallop and " no friction rub.    No murmur heard.  Pulmonary/Chest: Effort normal and breath sounds normal.   Abdominal: Soft. Bowel sounds are normal. He exhibits no mass. There is no tenderness.   Musculoskeletal: Normal range of motion. He exhibits no edema.   Lymphadenopathy:     He has no cervical adenopathy.   Neurological: He is alert and oriented to person, place, and time.   Skin: Skin is warm and dry. No rash noted.   Psychiatric: He has a normal mood and affect.       Lab Results   Component Value Date     06/06/2018    K 3.6 06/06/2018     06/06/2018    CO2 22.6 (L) 06/06/2018    BUN 12 06/06/2018    CREATININE 0.74 06/06/2018    GLUCOSE 152 (H) 06/06/2018    CALCIUM 9.4 06/06/2018    AST 23 06/06/2018    ALT 29 06/06/2018    ALKPHOS 66 06/06/2018    LABIL2 1.6 04/23/2016     Lab Results   Component Value Date    CKTOTAL 115 06/05/2018     Lab Results   Component Value Date    WBC 6.38 06/06/2018    HGB 14.9 06/06/2018    HCT 44.0 06/06/2018     (L) 06/06/2018     Lab Results   Component Value Date    INR 0.95 04/23/2016    INR 0.96 04/19/2016     Lab Results   Component Value Date    MG 2.2 06/06/2018     Lab Results   Component Value Date    TSH 0.744 06/05/2018    PSA 0.310 06/14/2018    CHLPL 100 05/05/2014    TRIG 432 (H) 06/05/2018    HDL 28 (L) 06/05/2018    LDL  06/05/2018      Comment:      Unable to calculate          ECG 12 Lead  Date/Time: 10/30/2018 1:03 PM  Performed by: ANGELA WORTHINGTON  Authorized by: ANGELA WORTHINGTON               Assessment/Plan    Diagnosis Plan   1. ASCVD (arteriosclerotic cardiovascular disease), status post stenting of the LAD June 5, 2018, clinically stable.     2. Dyspnea on exertion, probably due to Brilinta.     3. Essential hypertension     4. Dyslipidemia          Recommendations:  1. Since it seemed to have significant dyspnea with Brilinta, I discussed with him about changing this to Plavix and discussed with him about the decreased efficacy of the  Plavix as compared to Brilinta in the long run.  Patient expresses understanding and still would like to switch her over from Brilinta to Plavix.  2. Will start with Plavix 150 mg today and then 75 mg daily.  3. Follow up in 1-2 months.    Return in about 2 months (around 12/30/2018).    As always, Pasquale, I appreciate very much the opportunity to participate in the cardiovascular care of your patients.      With Best Regards,    John Davis MD, [facility]    Dragon disclaimer:  Much of this encounter note is an electronic transcription/translation of spoken language to printed text. The electronic translation of spoken language may permit erroneous, or at times, nonsensical words or phrases to be inadvertently transcribed; Although I have reviewed the note for such errors, some may still exist.

## 2018-11-12 ENCOUNTER — HOSPITAL ENCOUNTER (OUTPATIENT)
Dept: GENERAL RADIOLOGY | Facility: HOSPITAL | Age: 63
Discharge: HOME | End: 2018-11-12
Attending: INTERNAL MEDICINE | Admitting: INTERNAL MEDICINE

## 2018-11-12 DIAGNOSIS — R06.02 SOB (SHORTNESS OF BREATH): Primary | ICD-10-CM

## 2018-11-12 PROCEDURE — 71046 X-RAY EXAM CHEST 2 VIEWS: CPT | Performed by: RADIOLOGY

## 2018-11-12 PROCEDURE — 71046 X-RAY EXAM CHEST 2 VIEWS: CPT

## 2018-11-13 ENCOUNTER — OFFICE VISIT (OUTPATIENT)
Dept: PULMONOLOGY | Facility: CLINIC | Age: 63
End: 2018-11-13

## 2018-11-13 VITALS
DIASTOLIC BLOOD PRESSURE: 74 MMHG | HEIGHT: 74 IN | BODY MASS INDEX: 34.14 KG/M2 | TEMPERATURE: 98.2 F | HEART RATE: 72 BPM | SYSTOLIC BLOOD PRESSURE: 159 MMHG | WEIGHT: 266 LBS | OXYGEN SATURATION: 95 %

## 2018-11-13 DIAGNOSIS — I10 ESSENTIAL HYPERTENSION: ICD-10-CM

## 2018-11-13 DIAGNOSIS — I25.10 CORONARY ARTERY DISEASE INVOLVING NATIVE HEART WITHOUT ANGINA PECTORIS, UNSPECIFIED VESSEL OR LESION TYPE: ICD-10-CM

## 2018-11-13 DIAGNOSIS — J44.9 CHRONIC OBSTRUCTIVE PULMONARY DISEASE, UNSPECIFIED COPD TYPE (HCC): Primary | ICD-10-CM

## 2018-11-13 LAB
FEV1: 3.02 LITERS
FVC VOL RESPIRATORY: 3.63 LITERS

## 2018-11-13 PROCEDURE — 99203 OFFICE O/P NEW LOW 30 MIN: CPT | Performed by: INTERNAL MEDICINE

## 2018-11-13 PROCEDURE — 94010 BREATHING CAPACITY TEST: CPT | Performed by: INTERNAL MEDICINE

## 2018-11-13 ASSESSMENT — PULMONARY FUNCTION TESTS
FEV1: 3.02
FVC: 3.63

## 2018-11-13 NOTE — PROGRESS NOTES
History of Present Illness 63-year-old gentleman referred by Dr. Davis of cardiology for evaluation of shortness of breath.  He came to the office with his wife stating That she had a heart attack in June ended up having some stents placed on some medications including.  Nontender that he claims made him short of breath until he Quit that 2 weeks ago and started taking Plavix instead.  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 history of diabetes and hypertension for many years smoking of one pack a day For about 25 years but none for 25 years the other medications he takes is lisinopril 20 mg once a day and metoprolol 50 mg once a day      Review of Systems had some shortness of breath on exertion that resolved after he Discontinued his periodontal no chest pain and his blood sugar and blood pressure under control review of systems otherwise noncontributory trying to lose some weight with a heart attack    Active Problems:  Problem List Items Addressed This Visit        Cardiovascular and Mediastinum    Essential hypertension      Other Visit Diagnoses     Chronic obstructive pulmonary disease, unspecified COPD type (CMS/HCC)    -  Primary    Coronary artery disease involving native heart without angina pectoris, unspecified vessel or lesion type              Past Medical History:  Past Medical History:   Diagnosis Date   • Arthritis    • B12 deficiency    • Diabetes mellitus (CMS/HCC)    • Gout    • Hyperlipidemia    • Hypertension    • Liver cirrhosis (CMS/HCC)    • Low back pain    • Myocardial infarction (CMS/HCC)    • Peripheral neuropathy    • Thrombocytopenia (CMS/HCC)        Family History:  Family History   Problem Relation Age of Onset   • Heart disease Mother    • Cancer Mother    • Cancer Father        Social History:  Social History     Tobacco Use   • Smoking status: Never Smoker   • Smokeless tobacco: Never Used   Substance Use Topics   • Alcohol use: Yes     Comment: Rare  "      Current Medications:  Current Outpatient Medications   Medication Sig Dispense Refill   • allopurinol (ZYLOPRIM) 300 MG tablet Take 300 mg by mouth Every Night.     • aspirin 81 MG EC tablet Take 1 tablet by mouth Daily. 30 tablet 5   • atorvastatin (LIPITOR) 40 MG tablet Take 1 tablet by mouth Daily. 30 tablet 5   • clopidogrel (PLAVIX) 75 MG tablet Take 1 tablet by mouth Daily. 30 tablet 11   • HYDROcodone-acetaminophen (NORCO)  MG per tablet Take 1 tablet by mouth 3 (Three) Times a Day As Needed for Mild Pain .     • lisinopril-hydrochlorothiazide (PRINZIDE,ZESTORETIC) 20-12.5 MG per tablet Take 1 tablet by mouth 2 (Two) Times a Day. 60 tablet 5   • metFORMIN (GLUCOPHAGE) 1000 MG tablet Take 1 tablet by mouth 2 (Two) Times a Day. Start the 6/10-because of contrast given here. 60 tablet 0   • metoprolol succinate XL (TOPROL-XL) 50 MG 24 hr tablet Take 1 tablet by mouth Daily. 30 tablet 5   • nitroglycerin (NITROSTAT) 0.4 MG SL tablet Place 1 tablet under the tongue Every 5 (Five) Minutes As Needed for Chest Pain. Take no more than 3 doses in 15 minutes. 30 tablet 12   • tamsulosin (FLOMAX) 0.4 MG capsule 24 hr capsule Take 1 capsule by mouth every night.     • DULoxetine (CYMBALTA) 60 MG capsule Take 60 mg by mouth At Night As Needed. PRN neuropathic pain     • glimepiride (AMARYL) 2 MG tablet Take 2 mg by mouth Every Night.       No current facility-administered medications for this visit.        Allergies:  No Known Allergies    Vitals:  /74   Pulse 72   Temp 98.2 °F (36.8 °C)   Ht 188 cm (74\")   Wt 121 kg (266 lb)   SpO2 95%   BMI 34.15 kg/m²     Physical Exam no acute distress overweight weighing 266 pounds for height of 74 inches.  Blood pressure 159/74 O2 sat 95% on room air lungs clear heart regular no murmur or gallop and no edema clubbing cyanosis or joint changes abdomen is protuberant and no edema    Imaging: Chest x-ray Clear no cardiomegaly    PFT: FVC 68 FEV1 76% indicating " mild restrictive impairment due to abdominal obesity  Results for orders placed or performed in visit on 07/26/18   POC Urinalysis Dipstick, Automated   Result Value Ref Range    Color Yellow Yellow, Straw, Dark Yellow, Charlene    Clarity, UA Clear Clear    Specific Gravity  1.030 1.005 - 1.030    pH, Urine 5.0 5.0 - 8.0    Leukocytes Negative Negative    Nitrite, UA Negative Negative    Protein, POC Negative Negative mg/dL    Glucose, UA Negative Negative, 1000 mg/dL (3+) mg/dL    Ketones, UA Negative Negative    Urobilinogen, UA Normal Normal    Bilirubin Negative Negative    Blood, UA Negative Negative           ASSESSMENT AND DIAGNOSIS:1- COPD seemingly mild2-coronary artery disease with history of myocardial infarction and stenting Syringe under control3-hypertension on lisinopril and metoprolol.  Recommendation discontinue lisinopril and developed the Last lisinopril may Cause some bronchospasm Shortness of breath.  Check blood pressure at home within few days if not well-controlled check with Dr. Delaney primary care physician to adjust medications.  Get flu vaccine annually and pneumonia vaccine per protocol        Follow-Up: With Dr. Delaney and Dr. Davis and return to us in case of increasing shortness of breath

## 2019-01-31 ENCOUNTER — OFFICE VISIT (OUTPATIENT)
Dept: CARDIOLOGY | Facility: CLINIC | Age: 64
End: 2019-01-31

## 2019-01-31 VITALS
HEIGHT: 74 IN | RESPIRATION RATE: 16 BRPM | WEIGHT: 264 LBS | SYSTOLIC BLOOD PRESSURE: 156 MMHG | HEART RATE: 65 BPM | BODY MASS INDEX: 33.88 KG/M2 | DIASTOLIC BLOOD PRESSURE: 78 MMHG

## 2019-01-31 DIAGNOSIS — I25.10 ASCVD (ARTERIOSCLEROTIC CARDIOVASCULAR DISEASE): Primary | ICD-10-CM

## 2019-01-31 DIAGNOSIS — E78.5 DYSLIPIDEMIA: ICD-10-CM

## 2019-01-31 DIAGNOSIS — R06.09 DYSPNEA ON EXERTION: ICD-10-CM

## 2019-01-31 DIAGNOSIS — I10 ESSENTIAL HYPERTENSION: ICD-10-CM

## 2019-01-31 PROCEDURE — 99214 OFFICE O/P EST MOD 30 MIN: CPT | Performed by: NURSE PRACTITIONER

## 2019-01-31 RX ORDER — AMLODIPINE BESYLATE 5 MG/1
5 TABLET ORAL DAILY
Qty: 30 TABLET | Refills: 11 | Status: SHIPPED | OUTPATIENT
Start: 2019-01-31

## 2019-01-31 NOTE — PROGRESS NOTES
Fish Delaney MD  Adalberto Llamas  1955 01/31/2019    Patient Active Problem List   Diagnosis   • Liver cirrhosis (CMS/HCC)   • Thrombocytopenia (CMS/HCC)   • B12 deficiency   • Degenerative disc disease, cervical   • Degenerative disc disease, lumbar   • Spinal stenosis in cervical region   • Chest pain   • Other chest pain   • ASCVD (arteriosclerotic cardiovascular disease), status post stenting of the LAD June 5, 2018, clinically stable.   • Dyslipidemia   • Essential hypertension   • GERD (gastroesophageal reflux disease)   • Dyspnea on exertion       Dear Fish Delaney MD:    Subjective     Chief Complaint   Patient presents with   • ASCVD           History of Present Illness:    Adalberto Llamas is a 63 y.o. male with a history of known ASCVD status post stenting of LAD in June 2018, hypertension, dyslipidemia.  He presents today for routine cardiology follow-up.  Previously Brilinta was discontinued due to dyspnea and Plavix was started.  He reports his shortness of breath has markedly improved since the medication change.  His blood pressure is elevated in the office today.  He does not monitor at home.  He has been compliant with medications.  He states a few days ago he had some soreness in his left chest wall.  He relates this to postnasal drip and sinus congestion.  He denies cough or sputum production.  The pain was mild and was not brought on by any type of exertion.  He did take a nitroglycerin but this did relieve his symptoms.  Ultimately, his symptoms resolved.            No Known Allergies:      Current Outpatient Medications:   •  allopurinol (ZYLOPRIM) 300 MG tablet, Take 300 mg by mouth Every Night., Disp: , Rfl:   •  aspirin 81 MG EC tablet, Take 1 tablet by mouth Daily., Disp: 30 tablet, Rfl: 5  •  atorvastatin (LIPITOR) 40 MG tablet, Take 1 tablet by mouth Daily., Disp: 30 tablet, Rfl: 5  •  clopidogrel (PLAVIX) 75 MG tablet, Take 1 tablet by mouth Daily., Disp: 30 tablet, Rfl: 11  •   HYDROcodone-acetaminophen (NORCO)  MG per tablet, Take 1 tablet by mouth 3 (Three) Times a Day As Needed for Mild Pain ., Disp: , Rfl:   •  lisinopril-hydrochlorothiazide (PRINZIDE,ZESTORETIC) 20-12.5 MG per tablet, Take 1 tablet by mouth 2 (Two) Times a Day., Disp: 60 tablet, Rfl: 5  •  metFORMIN (GLUCOPHAGE) 1000 MG tablet, Take 1 tablet by mouth 2 (Two) Times a Day. Start the 6/10-because of contrast given here., Disp: 60 tablet, Rfl: 0  •  metoprolol succinate XL (TOPROL-XL) 50 MG 24 hr tablet, Take 1 tablet by mouth Daily., Disp: 30 tablet, Rfl: 5  •  nitroglycerin (NITROSTAT) 0.4 MG SL tablet, Place 1 tablet under the tongue Every 5 (Five) Minutes As Needed for Chest Pain. Take no more than 3 doses in 15 minutes., Disp: 30 tablet, Rfl: 12  •  tamsulosin (FLOMAX) 0.4 MG capsule 24 hr capsule, Take 1 capsule by mouth every night., Disp: , Rfl:   •  amLODIPine (NORVASC) 5 MG tablet, Take 1 tablet by mouth Daily., Disp: 30 tablet, Rfl: 11  •  DULoxetine (CYMBALTA) 60 MG capsule, Take 60 mg by mouth At Night As Needed. PRN neuropathic pain, Disp: , Rfl:   •  glimepiride (AMARYL) 2 MG tablet, Take 2 mg by mouth Every Night., Disp: , Rfl:       The following portions of the patient's history were reviewed and updated as appropriate: allergies, current medications, past family history, past medical history, past social history, past surgical history and problem list.    Social History     Tobacco Use   • Smoking status: Never Smoker   • Smokeless tobacco: Never Used   Substance Use Topics   • Alcohol use: Yes     Comment: Rare   • Drug use: No       Review of Systems   Constitution: Negative for weakness and malaise/fatigue.   Cardiovascular: Positive for chest pain. Negative for dyspnea on exertion, leg swelling, palpitations and syncope.   Respiratory: Negative for cough, shortness of breath and wheezing.    Neurological: Negative for dizziness and light-headedness.       Objective   Vitals:    01/31/19 0806  "  BP: 156/78   BP Location: Right arm   Pulse: 65   Resp: 16   Weight: 120 kg (264 lb)   Height: 188 cm (74.02\")     Body mass index is 33.88 kg/m².        Physical Exam   Constitutional: He is oriented to person, place, and time. He appears well-developed and well-nourished.   HENT:   Head: Normocephalic and atraumatic.   Cardiovascular: Normal rate, regular rhythm and normal heart sounds. Exam reveals no S3 and no S4.   No murmur heard.  Pulmonary/Chest: Effort normal and breath sounds normal. He has no wheezes. He has no rales.   Abdominal: Soft. Bowel sounds are normal.   Musculoskeletal: He exhibits no edema.   Neurological: He is alert and oriented to person, place, and time.   Skin: Skin is warm and dry.   Psychiatric: He has a normal mood and affect. His behavior is normal.       Lab Results   Component Value Date     06/06/2018    K 3.6 06/06/2018     06/06/2018    CO2 22.6 (L) 06/06/2018    BUN 12 06/06/2018    CREATININE 0.74 06/06/2018    GLUCOSE 152 (H) 06/06/2018    CALCIUM 9.4 06/06/2018    AST 23 06/06/2018    ALT 29 06/06/2018    ALKPHOS 66 06/06/2018    LABIL2 1.6 04/23/2016     Lab Results   Component Value Date    CKTOTAL 115 06/05/2018     Lab Results   Component Value Date    WBC 6.38 06/06/2018    HGB 14.9 06/06/2018    HCT 44.0 06/06/2018     (L) 06/06/2018     Lab Results   Component Value Date    INR 0.95 04/23/2016    INR 0.96 04/19/2016     Lab Results   Component Value Date    MG 2.2 06/06/2018     Lab Results   Component Value Date    TSH 0.744 06/05/2018    PSA 0.310 06/14/2018    CHLPL 100 05/05/2014    TRIG 432 (H) 06/05/2018    HDL 28 (L) 06/05/2018    LDL  06/05/2018      Comment:      Unable to calculate            Procedures      Assessment/Plan    Diagnosis Plan   1. ASCVD (arteriosclerotic cardiovascular disease), status post stenting of the LAD June 5, 2018, clinically stable.     2. Essential hypertension  amLODIPine (NORVASC) 5 MG tablet   3. Dyspnea on " exertion     4. Dyslipidemia                  Recommendations:    1. His blood pressure is not at goal. Will add amlodipine 5 mg daily    2. His chest pains occurred once and were very atypical for angina. However, given his history of recent PCI I have recommended a stress test to rule out ischemia. He has declined at this time and states he will let me know if he wishes to pursue this.     3. Continue low dose aspirin, atorvastatin, Plavix, lisinopril/HCTZ, and metoprolol.    4. Follow up in 2 months and PRN        Return in about 2 months (around 3/31/2019) for Recheck.    As always, I appreciate very much the opportunity to participate in the cardiovascular care of your patients.      With Best Regards,    CHRISTY Ortiz

## 2019-03-26 ENCOUNTER — OFFICE VISIT (OUTPATIENT)
Dept: CARDIOLOGY | Facility: CLINIC | Age: 64
End: 2019-03-26

## 2019-03-26 VITALS
WEIGHT: 270 LBS | BODY MASS INDEX: 34.65 KG/M2 | HEART RATE: 68 BPM | HEIGHT: 74 IN | DIASTOLIC BLOOD PRESSURE: 80 MMHG | OXYGEN SATURATION: 98 % | SYSTOLIC BLOOD PRESSURE: 152 MMHG

## 2019-03-26 DIAGNOSIS — I25.10 ASCVD (ARTERIOSCLEROTIC CARDIOVASCULAR DISEASE): Primary | ICD-10-CM

## 2019-03-26 DIAGNOSIS — E78.5 DYSLIPIDEMIA: ICD-10-CM

## 2019-03-26 DIAGNOSIS — I10 ESSENTIAL HYPERTENSION: ICD-10-CM

## 2019-03-26 PROCEDURE — 99213 OFFICE O/P EST LOW 20 MIN: CPT | Performed by: NURSE PRACTITIONER

## 2019-03-26 NOTE — PROGRESS NOTES
"Fish Delaney MD  Adalberto Llamas  1955 03/26/2019    Patient Active Problem List   Diagnosis   • Liver cirrhosis (CMS/HCC)   • Thrombocytopenia (CMS/HCC)   • B12 deficiency   • Degenerative disc disease, cervical   • Degenerative disc disease, lumbar   • Spinal stenosis in cervical region   • Chest pain   • Other chest pain   • ASCVD (arteriosclerotic cardiovascular disease), status post stenting of the LAD June 5, 2018, clinically stable.   • Dyslipidemia   • Essential hypertension   • GERD (gastroesophageal reflux disease)   • Dyspnea on exertion       Dear Fish Delaney MD:    Subjective     Chief Complaint   Patient presents with   • ASCVD (arteriosclerotic cardiovascular disease),   • Hypertension           History of Present Illness:    Adalberto Llamas is a 63 y.o. male with a history of known ASCVD status post stenting of the LAD in June 2018, hypertension, and dyslipidemia.  He presents today for routine cardiology follow-up.  Reports he has been doing very well.  He denies any episodes of chest pain or shortness of breath.  He is tolerating the Plavix very well.  Reports he has been compliant with medications.  His blood pressure is elevated in the office today.  He was prescribed amlodipine following his last visit.  He reports he has been taking the medication blood pressure at home has been \"great.\"  However, he is unsure what his pressure readings have been.          No Known Allergies:      Current Outpatient Medications:   •  allopurinol (ZYLOPRIM) 300 MG tablet, Take 300 mg by mouth Every Night., Disp: , Rfl:   •  amLODIPine (NORVASC) 5 MG tablet, Take 1 tablet by mouth Daily., Disp: 30 tablet, Rfl: 11  •  aspirin 81 MG EC tablet, Take 1 tablet by mouth Daily., Disp: 30 tablet, Rfl: 5  •  atorvastatin (LIPITOR) 40 MG tablet, Take 1 tablet by mouth Daily., Disp: 30 tablet, Rfl: 5  •  clopidogrel (PLAVIX) 75 MG tablet, Take 1 tablet by mouth Daily., Disp: 30 tablet, Rfl: 11  •  glimepiride " (AMARYL) 2 MG tablet, Take 2 mg by mouth Every Night., Disp: , Rfl:   •  HYDROcodone-acetaminophen (NORCO)  MG per tablet, Take 1 tablet by mouth 3 (Three) Times a Day As Needed for Mild Pain ., Disp: , Rfl:   •  lisinopril-hydrochlorothiazide (PRINZIDE,ZESTORETIC) 20-12.5 MG per tablet, Take 1 tablet by mouth 2 (Two) Times a Day., Disp: 60 tablet, Rfl: 5  •  metFORMIN (GLUCOPHAGE) 1000 MG tablet, Take 1 tablet by mouth 2 (Two) Times a Day. Start the 6/10-because of contrast given here., Disp: 60 tablet, Rfl: 0  •  metoprolol succinate XL (TOPROL-XL) 50 MG 24 hr tablet, Take 1 tablet by mouth Daily., Disp: 30 tablet, Rfl: 5  •  nitroglycerin (NITROSTAT) 0.4 MG SL tablet, Place 1 tablet under the tongue Every 5 (Five) Minutes As Needed for Chest Pain. Take no more than 3 doses in 15 minutes., Disp: 30 tablet, Rfl: 12  •  tamsulosin (FLOMAX) 0.4 MG capsule 24 hr capsule, Take 1 capsule by mouth every night., Disp: , Rfl:   •  DULoxetine (CYMBALTA) 60 MG capsule, Take 60 mg by mouth At Night As Needed. PRN neuropathic pain, Disp: , Rfl:       The following portions of the patient's history were reviewed and updated as appropriate: allergies, current medications, past family history, past medical history, past social history, past surgical history and problem list.    Social History     Tobacco Use   • Smoking status: Never Smoker   • Smokeless tobacco: Never Used   Substance Use Topics   • Alcohol use: Yes     Comment: Rare   • Drug use: No       Review of Systems   Constitution: Negative for weakness and malaise/fatigue.   Cardiovascular: Negative for chest pain, cyanosis, dyspnea on exertion, irregular heartbeat, leg swelling, near-syncope, orthopnea, palpitations, paroxysmal nocturnal dyspnea and syncope.   Respiratory: Negative for cough, shortness of breath and wheezing.    Neurological: Negative for dizziness and light-headedness.       Objective   Vitals:    03/26/19 0814   BP: 152/80   BP Location: Right  "arm   Patient Position: Sitting   Cuff Size: Adult   Pulse: 68   SpO2: 98%   Weight: 122 kg (270 lb)   Height: 188 cm (74.02\")     Body mass index is 34.65 kg/m².        Physical Exam   Constitutional: He is oriented to person, place, and time. He appears well-developed and well-nourished.   HENT:   Head: Normocephalic and atraumatic.   Cardiovascular: Normal rate, regular rhythm and normal heart sounds. Exam reveals no S3 and no S4.   No murmur heard.  Pulmonary/Chest: Effort normal and breath sounds normal. He has no wheezes. He has no rales.   Abdominal: Soft. Bowel sounds are normal.   Musculoskeletal: He exhibits no edema.   Neurological: He is alert and oriented to person, place, and time.   Skin: Skin is warm and dry.   Psychiatric: He has a normal mood and affect. His behavior is normal.       Lab Results   Component Value Date     06/06/2018    K 3.6 06/06/2018     06/06/2018    CO2 22.6 (L) 06/06/2018    BUN 12 06/06/2018    CREATININE 0.74 06/06/2018    GLUCOSE 152 (H) 06/06/2018    CALCIUM 9.4 06/06/2018    AST 23 06/06/2018    ALT 29 06/06/2018    ALKPHOS 66 06/06/2018    LABIL2 1.6 04/23/2016     Lab Results   Component Value Date    CKTOTAL 115 06/05/2018     Lab Results   Component Value Date    WBC 6.38 06/06/2018    HGB 14.9 06/06/2018    HCT 44.0 06/06/2018     (L) 06/06/2018     Lab Results   Component Value Date    INR 0.95 04/23/2016    INR 0.96 04/19/2016     Lab Results   Component Value Date    MG 2.2 06/06/2018     Lab Results   Component Value Date    TSH 0.744 06/05/2018    PSA 0.310 06/14/2018    CHLPL 100 05/05/2014    TRIG 432 (H) 06/05/2018    HDL 28 (L) 06/05/2018    LDL  06/05/2018      Comment:      Unable to calculate            Procedures      Assessment/Plan    Diagnosis Plan   1. ASCVD (arteriosclerotic cardiovascular disease), status post stenting of the LAD June 5, 2018, clinically stable.     2. Essential hypertension     3. Dyslipidemia              "     Recommendations:    1.  Continue low dose aspirin, atorvastatin, Plavix, lisinopril/HCTZ, and metoprolol.    2. I have asked him to call us with his BP readings in one week. If BP is above goal, will increase amlodipine to 10 mg daily.    3. Follow up in 3 to 4 months and PRN.    Return in about 4 months (around 7/26/2019) for Recheck.    As always, I appreciate very much the opportunity to participate in the cardiovascular care of your patients.      With Best Regards,    Farzana House, APRN

## 2019-06-17 ENCOUNTER — TRANSCRIBE ORDERS (OUTPATIENT)
Dept: ADMINISTRATIVE | Facility: HOSPITAL | Age: 64
End: 2019-06-17

## 2019-06-17 DIAGNOSIS — J32.0 CHRONIC MAXILLARY SINUSITIS: Primary | ICD-10-CM

## 2019-06-21 ENCOUNTER — HOSPITAL ENCOUNTER (OUTPATIENT)
Dept: CT IMAGING | Facility: HOSPITAL | Age: 64
Discharge: HOME | End: 2019-06-21
Admitting: INTERNAL MEDICINE

## 2019-06-21 DIAGNOSIS — J32.0 CHRONIC MAXILLARY SINUSITIS: ICD-10-CM

## 2019-06-21 PROCEDURE — 70486 CT MAXILLOFACIAL W/O DYE: CPT | Performed by: RADIOLOGY

## 2019-06-21 PROCEDURE — 70486 CT MAXILLOFACIAL W/O DYE: CPT

## 2019-07-15 ENCOUNTER — OFFICE VISIT (OUTPATIENT)
Dept: CARDIOLOGY | Facility: CLINIC | Age: 64
End: 2019-07-15

## 2019-07-15 VITALS
SYSTOLIC BLOOD PRESSURE: 123 MMHG | DIASTOLIC BLOOD PRESSURE: 70 MMHG | HEART RATE: 59 BPM | WEIGHT: 264 LBS | BODY MASS INDEX: 33.88 KG/M2 | RESPIRATION RATE: 18 BRPM | HEIGHT: 74 IN

## 2019-07-15 DIAGNOSIS — E78.5 DYSLIPIDEMIA: ICD-10-CM

## 2019-07-15 DIAGNOSIS — I25.10 ASCVD (ARTERIOSCLEROTIC CARDIOVASCULAR DISEASE): Primary | ICD-10-CM

## 2019-07-15 DIAGNOSIS — I10 ESSENTIAL HYPERTENSION: ICD-10-CM

## 2019-07-15 PROCEDURE — 99213 OFFICE O/P EST LOW 20 MIN: CPT | Performed by: NURSE PRACTITIONER

## 2019-07-15 PROCEDURE — 93000 ELECTROCARDIOGRAM COMPLETE: CPT | Performed by: NURSE PRACTITIONER

## 2019-07-15 NOTE — PROGRESS NOTES
Fish Delaney MD  Adalberto Llamas  1955  07/15/2019    Patient Active Problem List   Diagnosis   • Liver cirrhosis (CMS/HCC)   • Thrombocytopenia (CMS/HCC)   • B12 deficiency   • Degenerative disc disease, cervical   • Degenerative disc disease, lumbar   • Spinal stenosis in cervical region   • Chest pain   • Other chest pain   • ASCVD (arteriosclerotic cardiovascular disease), status post stenting of the LAD June 5, 2018, clinically stable.   • Dyslipidemia   • Essential hypertension   • GERD (gastroesophageal reflux disease)   • Dyspnea on exertion       Dear Fish Delaney MD:    Subjective     Chief Complaint   Patient presents with   • Follow-up     3+ mos   • Med Management     list provided           History of Present Illness:    Adalberto Llamas is a 64 y.o. male with a history of known ASCVD status post stenting of the LAD in June 2018, hypertension, dyslipidemia.  He presents today for routine cardiology follow-up.  He reports he has been doing very well.  He has been doing some strenuous walking and has tolerated that well without any chest pains or shortness of breath.  He denies palpitations, dizziness, and lightheadedness.  His blood pressure is very well controlled.  He has been taking low-dose aspirin and Plavix.  He is very anxious to discontinue his Plavix since it has been greater than 1 year since stent placement. He reports his PCP has recently obtained labs.          No Known Allergies:      Current Outpatient Medications:   •  allopurinol (ZYLOPRIM) 300 MG tablet, Take 300 mg by mouth Every Night., Disp: , Rfl:   •  amLODIPine (NORVASC) 5 MG tablet, Take 1 tablet by mouth Daily., Disp: 30 tablet, Rfl: 11  •  aspirin 81 MG EC tablet, Take 1 tablet by mouth Daily., Disp: 30 tablet, Rfl: 5  •  atorvastatin (LIPITOR) 40 MG tablet, Take 1 tablet by mouth Daily., Disp: 30 tablet, Rfl: 5  •  DULoxetine (CYMBALTA) 60 MG capsule, Take 60 mg by mouth At Night As Needed. PRN neuropathic pain,  "Disp: , Rfl:   •  glimepiride (AMARYL) 2 MG tablet, Take 2 mg by mouth Every Night., Disp: , Rfl:   •  HYDROcodone-acetaminophen (NORCO)  MG per tablet, Take 1 tablet by mouth 3 (Three) Times a Day As Needed for Mild Pain ., Disp: , Rfl:   •  lisinopril-hydrochlorothiazide (PRINZIDE,ZESTORETIC) 20-12.5 MG per tablet, Take 1 tablet by mouth 2 (Two) Times a Day., Disp: 60 tablet, Rfl: 5  •  metFORMIN (GLUCOPHAGE) 1000 MG tablet, Take 1 tablet by mouth 2 (Two) Times a Day. Start the 6/10-because of contrast given here., Disp: 60 tablet, Rfl: 0  •  metoprolol succinate XL (TOPROL-XL) 50 MG 24 hr tablet, Take 1 tablet by mouth Daily., Disp: 30 tablet, Rfl: 5  •  nitroglycerin (NITROSTAT) 0.4 MG SL tablet, Place 1 tablet under the tongue Every 5 (Five) Minutes As Needed for Chest Pain. Take no more than 3 doses in 15 minutes., Disp: 30 tablet, Rfl: 12  •  tamsulosin (FLOMAX) 0.4 MG capsule 24 hr capsule, Take 1 capsule by mouth every night., Disp: , Rfl:       The following portions of the patient's history were reviewed and updated as appropriate: allergies, current medications, past family history, past medical history, past social history, past surgical history and problem list.    Social History     Tobacco Use   • Smoking status: Never Smoker   • Smokeless tobacco: Never Used   Substance Use Topics   • Alcohol use: Yes     Comment: Rare   • Drug use: No       Review of Systems   Constitution: Negative for weakness and malaise/fatigue.   Cardiovascular: Negative for chest pain, dyspnea on exertion, irregular heartbeat, leg swelling, near-syncope, orthopnea, palpitations, paroxysmal nocturnal dyspnea and syncope.   Respiratory: Negative for cough, shortness of breath and wheezing.    Neurological: Negative for dizziness and light-headedness.       Objective   Vitals:    07/15/19 0846   BP: 123/70   Pulse: 59   Resp: 18   Weight: 120 kg (264 lb)   Height: 188 cm (74\")     Body mass index is 33.9 " kg/m².        Physical Exam   Constitutional: He is oriented to person, place, and time. He appears well-developed and well-nourished.   HENT:   Head: Normocephalic and atraumatic.   Cardiovascular: Normal rate, regular rhythm and normal heart sounds. Exam reveals no S3 and no S4.   No murmur heard.  Pulmonary/Chest: Effort normal and breath sounds normal. He has no wheezes. He has no rales.   Abdominal: Soft. Bowel sounds are normal.   Musculoskeletal: He exhibits no edema.   Neurological: He is alert and oriented to person, place, and time.   Skin: Skin is warm and dry.   Psychiatric: He has a normal mood and affect. His behavior is normal.             ECG 12 Lead  Date/Time: 7/15/2019 8:50 AM  Performed by: Farzana House APRN  Authorized by: Farzana House APRN   Comparison: compared with previous ECG   Similar to previous ECG  Rhythm: sinus rhythm  BPM: 62  Conduction: left bundle branch block              Assessment/Plan    Diagnosis Plan   1. ASCVD (arteriosclerotic cardiovascular disease), status post stenting of the LAD June 5, 2018, clinically stable.  ECG 12 Lead   2. Essential hypertension  ECG 12 Lead   3. Dyslipidemia  ECG 12 Lead                Recommendations:    1. We have discussed continuing DAPT. However, he is very anxious to discontinue Plavix. He will continue low dose aspirin.    2. Continue amlodipine, atorvastatin, lisinopril/HCTZ, and metoprolol.    3. Follow up in 6 months and PRN.      Return in about 6 months (around 1/15/2020) for Recheck.    As always, I appreciate very much the opportunity to participate in the cardiovascular care of your patients.      With Best Regards,    CHRISTY Ortiz

## 2019-07-26 ENCOUNTER — OFFICE VISIT (OUTPATIENT)
Dept: UROLOGY | Facility: CLINIC | Age: 64
End: 2019-07-26

## 2019-07-26 VITALS
HEIGHT: 75 IN | WEIGHT: 266.4 LBS | SYSTOLIC BLOOD PRESSURE: 120 MMHG | BODY MASS INDEX: 33.12 KG/M2 | TEMPERATURE: 98.3 F | DIASTOLIC BLOOD PRESSURE: 58 MMHG

## 2019-07-26 DIAGNOSIS — N52.9 ERECTILE DYSFUNCTION, UNSPECIFIED ERECTILE DYSFUNCTION TYPE: ICD-10-CM

## 2019-07-26 DIAGNOSIS — N40.1 BENIGN PROSTATIC HYPERPLASIA WITH WEAK URINARY STREAM: Primary | ICD-10-CM

## 2019-07-26 DIAGNOSIS — R39.12 BENIGN PROSTATIC HYPERPLASIA WITH WEAK URINARY STREAM: Primary | ICD-10-CM

## 2019-07-26 PROCEDURE — 99214 OFFICE O/P EST MOD 30 MIN: CPT | Performed by: UROLOGY

## 2019-07-26 NOTE — PROGRESS NOTES
Chief Complaint:          BPH    HPI:   64 y.o. male.  Pt is on flomax.  Pt has nocturia 2 to 3 times but is not bothered by this.  HPI  Pt has erectile dysfunction but he does carry nitroglycerin.  He has never used it.    Past Medical History:        Past Medical History:   Diagnosis Date   • Arthritis    • B12 deficiency    • Diabetes mellitus (CMS/HCC)    • Gout    • Hyperlipidemia    • Hypertension    • Liver cirrhosis (CMS/HCC)    • Low back pain    • Myocardial infarction (CMS/HCC)    • Peripheral neuropathy    • Thrombocytopenia (CMS/HCC)          Current Meds:     Current Outpatient Medications   Medication Sig Dispense Refill   • allopurinol (ZYLOPRIM) 300 MG tablet Take 300 mg by mouth Every Night.     • amLODIPine (NORVASC) 5 MG tablet Take 1 tablet by mouth Daily. 30 tablet 11   • aspirin 81 MG EC tablet Take 1 tablet by mouth Daily. 30 tablet 5   • atorvastatin (LIPITOR) 40 MG tablet Take 1 tablet by mouth Daily. 30 tablet 5   • glimepiride (AMARYL) 2 MG tablet Take 2 mg by mouth Every Night.     • HYDROcodone-acetaminophen (NORCO)  MG per tablet Take 1 tablet by mouth 3 (Three) Times a Day As Needed for Mild Pain .     • lisinopril-hydrochlorothiazide (PRINZIDE,ZESTORETIC) 20-12.5 MG per tablet Take 1 tablet by mouth 2 (Two) Times a Day. 60 tablet 5   • metFORMIN (GLUCOPHAGE) 1000 MG tablet Take 1 tablet by mouth 2 (Two) Times a Day. Start the 6/10-because of contrast given here. 60 tablet 0   • metoprolol succinate XL (TOPROL-XL) 50 MG 24 hr tablet Take 1 tablet by mouth Daily. 30 tablet 5   • nitroglycerin (NITROSTAT) 0.4 MG SL tablet Place 1 tablet under the tongue Every 5 (Five) Minutes As Needed for Chest Pain. Take no more than 3 doses in 15 minutes. 30 tablet 12   • tamsulosin (FLOMAX) 0.4 MG capsule 24 hr capsule Take 1 capsule by mouth every night.     • DULoxetine (CYMBALTA) 60 MG capsule Take 60 mg by mouth At Night As Needed. PRN neuropathic pain       No current  facility-administered medications for this visit.         Allergies:      No Known Allergies     Past Surgical History:     Past Surgical History:   Procedure Laterality Date   • CARDIAC CATHETERIZATION N/A 6/6/2018    Procedure: Left Heart Cath;  Surgeon: Jax Chappell MD;  Location:  [facility];  Service: Cardiovascular   • CARPAL TUNNEL RELEASE Bilateral     Dr. Juan   • CHOLECYSTECTOMY     • OR RT/LT HEART CATHETERS N/A 6/6/2018    Procedure: Percutaneous Coronary Intervention;  Surgeon: Jax Chappell MD;  Location:  [facility];  Service: Cardiovascular   • TONSILLECTOMY           Social History:     Social History     Socioeconomic History   • Marital status:      Spouse name: Not on file   • Number of children: Not on file   • Years of education: Not on file   • Highest education level: Not on file   Tobacco Use   • Smoking status: Never Smoker   • Smokeless tobacco: Never Used   Substance and Sexual Activity   • Alcohol use: Yes     Comment: Rare   • Drug use: No   • Sexual activity: Defer       Family History:     Family History   Problem Relation Age of Onset   • Heart disease Mother    • Cancer Mother    • Cancer Father        Review of Systems:     Review of Systems   Constitutional: Negative for chills, fatigue and fever.   HENT: Negative for congestion and sinus pressure.    Respiratory: Negative for shortness of breath.    Cardiovascular: Negative for chest pain.   Gastrointestinal: Negative for abdominal pain, constipation, diarrhea, nausea and vomiting.   Genitourinary: Negative for frequency and urgency.   Musculoskeletal: Negative for back pain and neck pain.   Neurological: Negative for dizziness and headaches.   Hematological: Does not bruise/bleed easily.   Psychiatric/Behavioral: The patient is not nervous/anxious.        IPSS Questionnaire (AUA-7):  Over the past month…    1)  Incomplete Emptying  How often have you had a sensation of not  emptying your bladder?  0 - Not at all   2)  Frequency  How often have you had to urinate less than every two hours? 0 - Not at all   3)  Intermittency  How often have you found you stopped and started again several times when you urinated?  5 - Almost always   4) Urgency  How often have you found it difficult to postpone urination?  0 - Not at all   5) Weak Stream  How often have you had a weak urinary stream?  0 - Not at all   6) Straining  How often have you had to push or strain to begin urination?  0 - Not at all   7) Nocturia  How many times did you typically get up at night to urinate?  3 - 3 times   Total Score:  8       Quality of life due to urinary symptoms:  If you were to spend the rest of your life with your urinary condition the way it is now, how would you feel about that? 1-Pleased   Urine Leakage (Incontinence) 0-No Leakage       I have reviewed the follow portions of the patient's history and confirmed they are accurate today:  allergies, current medications, past family history, past medical history, past social history, past surgical history, problem list and ROS  Physical Exam:     Physical Exam   Constitutional: He is oriented to person, place, and time.   HENT:   Head: Normocephalic and atraumatic.   Right Ear: External ear normal.   Left Ear: External ear normal.   Nose: Nose normal.   Mouth/Throat: Oropharynx is clear and moist.   Eyes: Conjunctivae and EOM are normal. Pupils are equal, round, and reactive to light.   Neck: Normal range of motion. Neck supple. No thyromegaly present.   Cardiovascular: Normal rate, regular rhythm, normal heart sounds and intact distal pulses.   No murmur heard.  Pulmonary/Chest: Effort normal and breath sounds normal. No respiratory distress. He has no wheezes. He has no rales. He exhibits no tenderness.   Abdominal: Soft. Bowel sounds are normal. He exhibits no distension and no mass. There is no tenderness. No hernia.   Genitourinary: Rectum normal,  "prostate normal and penis normal.   Genitourinary Comments: No nodules   Musculoskeletal: Normal range of motion. He exhibits no edema or tenderness.   Lymphadenopathy:     He has no cervical adenopathy.   Neurological: He is alert and oriented to person, place, and time. No cranial nerve deficit. He exhibits normal muscle tone. Coordination normal.   Skin: Skin is warm. No rash noted.   Psychiatric: He has a normal mood and affect. His behavior is normal. Judgment and thought content normal.   Nursing note and vitals reviewed.      /58 (BP Location: Right arm, Patient Position: Sitting)   Temp 98.3 °F (36.8 °C)   Ht 190.5 cm (75\")   Wt 121 kg (266 lb 6.4 oz)   BMI 33.30 kg/m²    Procedure:         Assessment:     Encounter Diagnoses   Name Primary?   • Benign prostatic hyperplasia with weak urinary stream Yes   • Erectile dysfunction, unspecified erectile dysfunction type        No orders of the defined types were placed in this encounter.      Plan:   I explained that viagra or cialis are first line therapy for ED but they can not be safely prescribed to a person that possibly can take NTG.  I think he should get cardiac clearance to take either viagra or cialis and not ever take NTG  Another option would be penile injections.    Patient's Body mass index is 33.3 kg/m². BMI is above normal parameters. Recommendations include: educational material.      Smoking Cessation Counseling:  Former smoker.  Patient does not currently use any tobacco products.   Pt quit smoking 30 years ago.      Counseling was given to patient for the following topics instructions for management as follows: ED. The interim medical history and current results were reviewed.  A treatment plan with follow-up was made for Benign prostatic hyperplasia with weak urinary stream [N40.1, R39.12]. I spent 27 minutes face to face with Adalberto Llamas and 80 percentage was spent in counseling.       This document has been electronically signed " by Charles Leigh MD July 26, 2019 2:59 PM

## 2019-10-17 RX ORDER — METOPROLOL SUCCINATE 50 MG/1
TABLET, EXTENDED RELEASE ORAL
Qty: 30 TABLET | Refills: 3 | Status: SHIPPED | OUTPATIENT
Start: 2019-10-17

## 2019-10-17 RX ORDER — METOPROLOL SUCCINATE 50 MG/1
TABLET, EXTENDED RELEASE ORAL
Qty: 30 TABLET | Refills: 1 | Status: SHIPPED | OUTPATIENT
Start: 2019-10-17 | End: 2019-10-17 | Stop reason: SDUPTHER

## 2019-10-17 RX ORDER — ATORVASTATIN CALCIUM 40 MG/1
TABLET, FILM COATED ORAL
Qty: 30 TABLET | Refills: 3 | Status: SHIPPED | OUTPATIENT
Start: 2019-10-17 | End: 2022-10-20 | Stop reason: ALTCHOICE

## 2019-12-04 ENCOUNTER — HOSPITAL ENCOUNTER (EMERGENCY)
Facility: HOSPITAL | Age: 64
Discharge: HOME | End: 2019-12-05
Attending: FAMILY MEDICINE | Admitting: FAMILY MEDICINE

## 2019-12-04 ENCOUNTER — APPOINTMENT (OUTPATIENT)
Dept: GENERAL RADIOLOGY | Facility: HOSPITAL | Age: 64
End: 2019-12-04

## 2019-12-04 DIAGNOSIS — R07.9 CHEST PAIN, UNSPECIFIED TYPE: Primary | ICD-10-CM

## 2019-12-04 LAB
ALBUMIN SERPL-MCNC: 4.5 G/DL (ref 3.5–5.2)
ALBUMIN/GLOB SERPL: 1.4 G/DL
ALP SERPL-CCNC: 70 U/L (ref 39–117)
ALT SERPL W P-5'-P-CCNC: 26 U/L (ref 1–41)
ANION GAP SERPL CALCULATED.3IONS-SCNC: 14.8 MMOL/L (ref 5–15)
AST SERPL-CCNC: 23 U/L (ref 1–40)
BASOPHILS # BLD AUTO: 0.02 10*3/MM3 (ref 0–0.2)
BASOPHILS NFR BLD AUTO: 0.3 % (ref 0–1.5)
BILIRUB SERPL-MCNC: 0.5 MG/DL (ref 0.2–1.2)
BUN BLD-MCNC: 16 MG/DL (ref 8–23)
BUN/CREAT SERPL: 17.4 (ref 7–25)
CALCIUM SPEC-SCNC: 10.5 MG/DL (ref 8.6–10.5)
CHLORIDE SERPL-SCNC: 101 MMOL/L (ref 98–107)
CK MB SERPL-CCNC: 3.65 NG/ML
CK SERPL-CCNC: 103 U/L (ref 20–200)
CO2 SERPL-SCNC: 22.2 MMOL/L (ref 22–29)
CREAT BLD-MCNC: 0.92 MG/DL (ref 0.76–1.27)
DEPRECATED RDW RBC AUTO: 50.4 FL (ref 37–54)
EOSINOPHIL # BLD AUTO: 0.11 10*3/MM3 (ref 0–0.4)
EOSINOPHIL NFR BLD AUTO: 1.4 % (ref 0.3–6.2)
ERYTHROCYTE [DISTWIDTH] IN BLOOD BY AUTOMATED COUNT: 13.6 % (ref 12.3–15.4)
GFR SERPL CREATININE-BSD FRML MDRD: 82 ML/MIN/1.73
GLOBULIN UR ELPH-MCNC: 3.2 GM/DL
GLUCOSE BLD-MCNC: 285 MG/DL (ref 65–99)
HCT VFR BLD AUTO: 47.3 % (ref 37.5–51)
HGB BLD-MCNC: 15.8 G/DL (ref 13–17.7)
HOLD SPECIMEN: NORMAL
HOLD SPECIMEN: NORMAL
IMM GRANULOCYTES # BLD AUTO: 0.04 10*3/MM3 (ref 0–0.05)
IMM GRANULOCYTES NFR BLD AUTO: 0.5 % (ref 0–0.5)
LYMPHOCYTES # BLD AUTO: 3.03 10*3/MM3 (ref 0.7–3.1)
LYMPHOCYTES NFR BLD AUTO: 38.9 % (ref 19.6–45.3)
MCH RBC QN AUTO: 33.2 PG (ref 26.6–33)
MCHC RBC AUTO-ENTMCNC: 33.4 G/DL (ref 31.5–35.7)
MCV RBC AUTO: 99.4 FL (ref 79–97)
MONOCYTES # BLD AUTO: 0.85 10*3/MM3 (ref 0.1–0.9)
MONOCYTES NFR BLD AUTO: 10.9 % (ref 5–12)
NEUTROPHILS # BLD AUTO: 3.74 10*3/MM3 (ref 1.7–7)
NEUTROPHILS NFR BLD AUTO: 48 % (ref 42.7–76)
NRBC BLD AUTO-RTO: 0 /100 WBC (ref 0–0.2)
PLATELET # BLD AUTO: 127 10*3/MM3 (ref 140–450)
PMV BLD AUTO: 11.7 FL (ref 6–12)
POTASSIUM BLD-SCNC: 4.2 MMOL/L (ref 3.5–5.2)
PROT SERPL-MCNC: 7.7 G/DL (ref 6–8.5)
RBC # BLD AUTO: 4.76 10*6/MM3 (ref 4.14–5.8)
SODIUM BLD-SCNC: 138 MMOL/L (ref 136–145)
TROPONIN T SERPL-MCNC: <0.01 NG/ML (ref 0–0.03)
WBC NRBC COR # BLD: 7.79 10*3/MM3 (ref 3.4–10.8)
WHOLE BLOOD HOLD SPECIMEN: NORMAL
WHOLE BLOOD HOLD SPECIMEN: NORMAL

## 2019-12-04 PROCEDURE — 82550 ASSAY OF CK (CPK): CPT | Performed by: EMERGENCY MEDICINE

## 2019-12-04 PROCEDURE — 82553 CREATINE MB FRACTION: CPT | Performed by: EMERGENCY MEDICINE

## 2019-12-04 PROCEDURE — 85025 COMPLETE CBC W/AUTO DIFF WBC: CPT | Performed by: EMERGENCY MEDICINE

## 2019-12-04 PROCEDURE — 80053 COMPREHEN METABOLIC PANEL: CPT | Performed by: EMERGENCY MEDICINE

## 2019-12-04 PROCEDURE — 93005 ELECTROCARDIOGRAM TRACING: CPT | Performed by: EMERGENCY MEDICINE

## 2019-12-04 PROCEDURE — 71045 X-RAY EXAM CHEST 1 VIEW: CPT

## 2019-12-04 PROCEDURE — 99283 EMERGENCY DEPT VISIT LOW MDM: CPT

## 2019-12-04 PROCEDURE — 84484 ASSAY OF TROPONIN QUANT: CPT | Performed by: EMERGENCY MEDICINE

## 2019-12-05 ENCOUNTER — TRANSCRIBE ORDERS (OUTPATIENT)
Dept: ADMINISTRATIVE | Facility: HOSPITAL | Age: 64
End: 2019-12-05

## 2019-12-05 VITALS
OXYGEN SATURATION: 98 % | SYSTOLIC BLOOD PRESSURE: 167 MMHG | WEIGHT: 271 LBS | TEMPERATURE: 98.1 F | HEIGHT: 74 IN | BODY MASS INDEX: 34.78 KG/M2 | HEART RATE: 78 BPM | DIASTOLIC BLOOD PRESSURE: 78 MMHG | RESPIRATION RATE: 17 BRPM

## 2019-12-05 DIAGNOSIS — R07.9 CHEST PAIN, UNSPECIFIED TYPE: Primary | ICD-10-CM

## 2019-12-05 LAB — TROPONIN T SERPL-MCNC: <0.01 NG/ML (ref 0–0.03)

## 2019-12-05 NOTE — DISCHARGE INSTRUCTIONS
Follow up for outpatient stress test. Return to the ER should you have worsening CP or new onset of symptoms.

## 2019-12-05 NOTE — ED PROVIDER NOTES
Subjective   64-year-old male with past medical history of arthritis, B12 deficiency, diabetes, gout, hyperlipidemia, hypertension, cirrhosis, back pain, myocardial infarctions, peripheral neuropathy, and thrombocytopenia presents to the emergency room with left-sided chest pain a few days duration.  Patient states his pain is worse with movement and feels sore in nature.  He denies shortness of breath, nausea, vomiting, abdominal pain, back pain, or headache.  Denies any alleviating factors.  Patient reports this pain does not feel the same as his previous MI.        History provided by:  Patient   used: No        Review of Systems   Constitutional: Negative.  Negative for fever.   HENT: Negative.    Respiratory: Negative.    Cardiovascular: Positive for chest pain.   Gastrointestinal: Negative.  Negative for abdominal pain.   Endocrine: Negative.    Genitourinary: Negative.  Negative for dysuria.   Skin: Negative.    Neurological: Negative.    Psychiatric/Behavioral: Negative.    All other systems reviewed and are negative.      Past Medical History:   Diagnosis Date   • Arthritis    • B12 deficiency    • Diabetes mellitus (CMS/HCC)    • Gout    • Hyperlipidemia    • Hypertension    • Liver cirrhosis (CMS/HCC)    • Low back pain    • Myocardial infarction (CMS/HCC)    • Peripheral neuropathy    • Thrombocytopenia (CMS/HCC)        No Known Allergies    Past Surgical History:   Procedure Laterality Date   • CARDIAC CATHETERIZATION N/A 6/6/2018    Procedure: Left Heart Cath;  Surgeon: Jax Chappell MD;  Location: [facility] CATH INVASIVE LOCATION;  Service: Cardiovascular   • CARPAL TUNNEL RELEASE Bilateral     Dr. Juan   • CHOLECYSTECTOMY     • CA RT/LT HEART CATHETERS N/A 6/6/2018    Procedure: Percutaneous Coronary Intervention;  Surgeon: Jax Chappell MD;  Location:  [facility];  Service: Cardiovascular   • TONSILLECTOMY         Family History   Problem Relation Age of  Onset   • Heart disease Mother    • Cancer Mother    • Cancer Father        Social History     Socioeconomic History   • Marital status:      Spouse name: Not on file   • Number of children: Not on file   • Years of education: Not on file   • Highest education level: Not on file   Tobacco Use   • Smoking status: Never Smoker   • Smokeless tobacco: Never Used   Substance and Sexual Activity   • Alcohol use: Yes     Comment: Rare   • Drug use: No   • Sexual activity: Defer           Objective   Physical Exam   Constitutional: He is oriented to person, place, and time. He appears well-developed and well-nourished. No distress.   HENT:   Head: Normocephalic and atraumatic.   Right Ear: External ear normal.   Left Ear: External ear normal.   Nose: Nose normal.   Eyes: Conjunctivae and EOM are normal. Pupils are equal, round, and reactive to light.   Neck: Normal range of motion. Neck supple. No JVD present. No tracheal deviation present.   Cardiovascular: Normal rate, regular rhythm and normal heart sounds.   No murmur heard.  Pulmonary/Chest: Effort normal and breath sounds normal. No respiratory distress. He has no wheezes.       Abdominal: Soft. Bowel sounds are normal. There is no tenderness.   Musculoskeletal: Normal range of motion. He exhibits no edema or deformity.   Neurological: He is alert and oriented to person, place, and time. No cranial nerve deficit.   Skin: Skin is warm and dry. No rash noted. He is not diaphoretic. No erythema. No pallor.   Psychiatric: He has a normal mood and affect. His behavior is normal. Thought content normal.   Nursing note and vitals reviewed.      Procedures           ED Course  ED Course as of Dec 05 0231   Wed Dec 04, 2019   2330 IMPRESSION:   No acute cardiopulmonary findings.    Signer Name: MIGUEL Cali MD  Signed: 12/4/2019 9:53 PM  Workstation Name: [email or web address]   [facility]  XR Chest 1 View [TK]   2331 Per Dr. Swan, no acute  ischemic changes. NSR with HR 60. LBBB. No significant change as compared to previous EKG on 06/07/18. ECG 12 Lead [TK]   u Dec 05, 2019   0033 Dr. Nguyen has seen and evaluated patient agrees with current treatment and plan. Patient was offered admission for observation status however patient prefers to be worked up as an outpatient. Will send with outpatient stress test form.  [TK]      ED Course User Index  [TK] Salazar Michelle PA-C                HEART Score (for prediction of 6-week risk of major adverse cardiac event) reviewed and/or performed as part of the patient evaluation and treatment planning process.  The result associated with this review/performance is: 4       MDM  Number of Diagnoses or Management Options  Chest pain, unspecified type: new and requires workup     Amount and/or Complexity of Data Reviewed  Clinical lab tests: reviewed and ordered  Tests in the radiology section of CPT®: reviewed and ordered  Tests in the medicine section of CPT®: reviewed and ordered  Discuss the patient with other providers: yes (Patrick)    Risk of Complications, Morbidity, and/or Mortality  Presenting problems: moderate  Diagnostic procedures: moderate  Management options: moderate    Patient Progress  Patient progress: stable      Final diagnoses:   Chest pain, unspecified type              Salazar Michelle PA-C  12/05/19 0231

## 2019-12-10 ENCOUNTER — HOSPITAL ENCOUNTER (OUTPATIENT)
Dept: CARDIOLOGY | Facility: HOSPITAL | Age: 64
Discharge: HOME | End: 2019-12-10

## 2019-12-10 ENCOUNTER — HOSPITAL ENCOUNTER (OUTPATIENT)
Dept: NUCLEAR MEDICINE | Facility: HOSPITAL | Age: 64
Discharge: HOME | End: 2019-12-10

## 2019-12-10 DIAGNOSIS — R07.9 CHEST PAIN, UNSPECIFIED TYPE: ICD-10-CM

## 2019-12-10 LAB
BH CV NUCLEAR PRIOR STUDY: 3
BH CV STRESS BP STAGE 1: NORMAL
BH CV STRESS BP STAGE 2: NORMAL
BH CV STRESS COMMENTS STAGE 1: NORMAL
BH CV STRESS COMMENTS STAGE 2: NORMAL
BH CV STRESS DOSE REGADENOSON STAGE 1: 0.4
BH CV STRESS DURATION MIN STAGE 1: 0
BH CV STRESS DURATION MIN STAGE 2: 4
BH CV STRESS DURATION SEC STAGE 1: 10
BH CV STRESS DURATION SEC STAGE 2: 0
BH CV STRESS HR STAGE 1: 62
BH CV STRESS HR STAGE 2: 56
BH CV STRESS PROTOCOL 1: NORMAL
BH CV STRESS RECOVERY BP: NORMAL MMHG
BH CV STRESS RECOVERY HR: 56 BPM
BH CV STRESS STAGE 1: 1
BH CV STRESS STAGE 2: 2
LV EF NUC BP: 55 %
MAXIMAL PREDICTED HEART RATE: 154 BPM
PERCENT MAX PREDICTED HR: 40.26 %
STRESS BASELINE BP: NORMAL MMHG
STRESS BASELINE HR: 51 BPM
STRESS PERCENT HR: 47 %
STRESS POST PEAK BP: NORMAL MMHG
STRESS POST PEAK HR: 62 BPM
STRESS TARGET HR: 131 BPM

## 2019-12-10 PROCEDURE — A9500 TC99M SESTAMIBI: HCPCS | Performed by: PHYSICIAN ASSISTANT

## 2019-12-10 PROCEDURE — 78452 HT MUSCLE IMAGE SPECT MULT: CPT

## 2019-12-10 PROCEDURE — 25010000002 REGADENOSON 0.4 MG/5ML SOLUTION: Performed by: PHYSICIAN ASSISTANT

## 2019-12-10 PROCEDURE — 0 TECHNETIUM SESTAMIBI: Performed by: PHYSICIAN ASSISTANT

## 2019-12-10 PROCEDURE — 93018 CV STRESS TEST I&R ONLY: CPT | Performed by: INTERNAL MEDICINE

## 2019-12-10 PROCEDURE — 93017 CV STRESS TEST TRACING ONLY: CPT

## 2019-12-10 PROCEDURE — 78452 HT MUSCLE IMAGE SPECT MULT: CPT | Performed by: INTERNAL MEDICINE

## 2019-12-10 RX ADMIN — TECHNETIUM TC 99M SESTAMIBI 1 DOSE: 1 INJECTION INTRAVENOUS at 09:37

## 2019-12-10 RX ADMIN — REGADENOSON 0.4 MG: 0.08 INJECTION, SOLUTION INTRAVENOUS at 09:37

## 2019-12-10 RX ADMIN — TECHNETIUM TC 99M SESTAMIBI 1 DOSE: 1 INJECTION INTRAVENOUS at 08:25

## 2020-01-15 ENCOUNTER — OFFICE VISIT (OUTPATIENT)
Dept: CARDIOLOGY | Facility: CLINIC | Age: 65
End: 2020-01-15

## 2020-01-15 VITALS
RESPIRATION RATE: 16 BRPM | HEART RATE: 58 BPM | SYSTOLIC BLOOD PRESSURE: 140 MMHG | DIASTOLIC BLOOD PRESSURE: 74 MMHG | BODY MASS INDEX: 34.65 KG/M2 | HEIGHT: 74 IN | WEIGHT: 270 LBS

## 2020-01-15 DIAGNOSIS — I25.10 ASCVD (ARTERIOSCLEROTIC CARDIOVASCULAR DISEASE): Primary | ICD-10-CM

## 2020-01-15 DIAGNOSIS — I10 ESSENTIAL HYPERTENSION: ICD-10-CM

## 2020-01-15 DIAGNOSIS — E78.5 DYSLIPIDEMIA: ICD-10-CM

## 2020-01-15 DIAGNOSIS — R07.89 OTHER CHEST PAIN: ICD-10-CM

## 2020-01-15 PROCEDURE — 99213 OFFICE O/P EST LOW 20 MIN: CPT | Performed by: NURSE PRACTITIONER

## 2020-01-15 NOTE — PROGRESS NOTES
Fish Delaney MD  Adalberto Llamas  1955  01/15/2020    Patient Active Problem List   Diagnosis   • Liver cirrhosis (CMS/HCC)   • Thrombocytopenia (CMS/HCC)   • B12 deficiency   • Degenerative disc disease, cervical   • Degenerative disc disease, lumbar   • Spinal stenosis in cervical region   • Chest pain   • Other chest pain   • ASCVD (arteriosclerotic cardiovascular disease), status post stenting of the LAD June 5, 2018, clinically stable.   • Dyslipidemia   • Essential hypertension   • GERD (gastroesophageal reflux disease)   • Dyspnea on exertion       Dear Fish Delaney MD:    Subjective     Chief Complaint   Patient presents with   • ASCVD           History of Present Illness:    Adalberto Llamas is a 64 y.o. male with a history of ASCVD status post stenting of the LAD in June 2018, hypertension, and dyslipidemia.  He presents today for routine cardiology follow-up.  Since his last visit, he presented to the ED on 12/4/2019 due to chest pains.  He reports he had moved a very heavy furnace and began to experience severe pain in his left chest.  At that time, EKG was stable with no acute ischemic changes.  Troponin was normal.  He reports he has had no further severe chest pains.  A Lexiscan sestamibi study was ordered by the ED physician.  This revealed no evidence of ischemia.  He does have pain in the left chest wall if he moves his left arm above his head.  He denies any compressive or exertional chest pains.  He denies shortness of breath, palpitations, dizziness, or lightheadedness.          No Known Allergies:      Current Outpatient Medications:   •  allopurinol (ZYLOPRIM) 300 MG tablet, Take 300 mg by mouth Every Night., Disp: , Rfl:   •  amLODIPine (NORVASC) 5 MG tablet, Take 1 tablet by mouth Daily., Disp: 30 tablet, Rfl: 11  •  aspirin 81 MG EC tablet, Take 1 tablet by mouth Daily., Disp: 30 tablet, Rfl: 5  •  atorvastatin (LIPITOR) 40 MG tablet, TAKE 1 TABLET BY MOUTH EVERY DAY, Disp: 30  tablet, Rfl: 3  •  glimepiride (AMARYL) 2 MG tablet, Take 2 mg by mouth Every Night., Disp: , Rfl:   •  HYDROcodone-acetaminophen (NORCO)  MG per tablet, Take 1 tablet by mouth 3 (Three) Times a Day As Needed for Mild Pain ., Disp: , Rfl:   •  lisinopril-hydrochlorothiazide (PRINZIDE,ZESTORETIC) 20-12.5 MG per tablet, Take 1 tablet by mouth 2 (Two) Times a Day., Disp: 60 tablet, Rfl: 5  •  metFORMIN (GLUCOPHAGE) 1000 MG tablet, Take 1 tablet by mouth 2 (Two) Times a Day. Start the 6/10-because of contrast given here., Disp: 60 tablet, Rfl: 0  •  metoprolol succinate XL (TOPROL-XL) 50 MG 24 hr tablet, TAKE 1 TABLET BY MOUTH EVERY DAY, Disp: 30 tablet, Rfl: 3  •  nitroglycerin (NITROSTAT) 0.4 MG SL tablet, Place 1 tablet under the tongue Every 5 (Five) Minutes As Needed for Chest Pain. Take no more than 3 doses in 15 minutes., Disp: 30 tablet, Rfl: 12  •  tamsulosin (FLOMAX) 0.4 MG capsule 24 hr capsule, Take 1 capsule by mouth every night., Disp: , Rfl:   •  DULoxetine (CYMBALTA) 60 MG capsule, Take 60 mg by mouth At Night As Needed. PRN neuropathic pain, Disp: , Rfl:       The following portions of the patient's history were reviewed and updated as appropriate: allergies, current medications, past family history, past medical history, past social history, past surgical history and problem list.    Social History     Tobacco Use   • Smoking status: Never Smoker   • Smokeless tobacco: Never Used   Substance Use Topics   • Alcohol use: Yes     Comment: Rare   • Drug use: No       Review of Systems   Constitution: Negative for malaise/fatigue.   Cardiovascular: Positive for chest pain. Negative for dyspnea on exertion, irregular heartbeat, leg swelling, near-syncope, orthopnea, palpitations, paroxysmal nocturnal dyspnea and syncope.   Respiratory: Negative for cough, shortness of breath and wheezing.    Neurological: Negative for dizziness, light-headedness and weakness.       Objective   Vitals:    01/15/20 0825  "  BP: 140/74   BP Location: Right arm   Pulse: 58   Resp: 16   Weight: 122 kg (270 lb)   Height: 188 cm (74.02\")     Body mass index is 34.65 kg/m².        Physical Exam   Constitutional: He is oriented to person, place, and time. He appears well-developed and well-nourished.   HENT:   Head: Normocephalic and atraumatic.   Cardiovascular: Normal rate, regular rhythm and normal heart sounds. Exam reveals no S3 and no S4.   No murmur heard.  Pulmonary/Chest: Effort normal and breath sounds normal. He has no wheezes. He has no rales.   Abdominal: Soft. Bowel sounds are normal.   Musculoskeletal: He exhibits no edema.   Neurological: He is alert and oriented to person, place, and time.   Skin: Skin is warm and dry.   Psychiatric: He has a normal mood and affect. His behavior is normal.       Lab Results   Component Value Date     12/04/2019    K 4.2 12/04/2019     12/04/2019    CO2 22.2 12/04/2019    BUN 16 12/04/2019    CREATININE 0.92 12/04/2019    GLUCOSE 285 (H) 12/04/2019    CALCIUM 10.5 12/04/2019    AST 23 12/04/2019    ALT 26 12/04/2019    ALKPHOS 70 12/04/2019    LABIL2 1.6 04/23/2016     Lab Results   Component Value Date    CKTOTAL 103 12/04/2019     Lab Results   Component Value Date    WBC 7.79 12/04/2019    HGB 15.8 12/04/2019    HCT 47.3 12/04/2019     (L) 12/04/2019     Lab Results   Component Value Date    INR 0.95 04/23/2016    INR 0.96 04/19/2016     Lab Results   Component Value Date    MG 2.2 06/06/2018     Lab Results   Component Value Date    TSH 0.744 06/05/2018    PSA 0.310 06/14/2018    CHLPL 100 05/05/2014    TRIG 432 (H) 06/05/2018    HDL 28 (L) 06/05/2018    LDL  06/05/2018      Comment:      Unable to calculate            Procedures      Assessment/Plan    Diagnosis Plan   1. ASCVD (arteriosclerotic cardiovascular disease), status post stenting of the LAD June 5, 2018, clinically stable.     2. Essential hypertension     3. Other chest pain     4. Dyslipidemia   "                Recommendations:    His chest pains do appear to be musculoskeletal since they can be reproduced with movement of the left arm.  However, I have offered to try isosorbide to see if this alleviates his pains.  He has declined at this time and states he is confident is musculoskeletal and will follow-up with his PCP.  We will continue with low-dose aspirin, atorvastatin, lisinopril/hydrochlorothiazide, and metoprolol.  He will follow-up in 6 months or sooner if needed.    Return in about 6 months (around 7/15/2020) for Recheck.    As always, I appreciate very much the opportunity to participate in the cardiovascular care of your patients.      With Best Regards,    CHRISTY Ortiz

## 2020-07-15 ENCOUNTER — OFFICE VISIT (OUTPATIENT)
Dept: CARDIOLOGY | Facility: CLINIC | Age: 65
End: 2020-07-15

## 2020-07-15 VITALS
HEART RATE: 63 BPM | TEMPERATURE: 99.2 F | DIASTOLIC BLOOD PRESSURE: 72 MMHG | BODY MASS INDEX: 34.06 KG/M2 | WEIGHT: 265.4 LBS | SYSTOLIC BLOOD PRESSURE: 151 MMHG | OXYGEN SATURATION: 99 % | HEIGHT: 74 IN

## 2020-07-15 DIAGNOSIS — I10 ESSENTIAL HYPERTENSION: ICD-10-CM

## 2020-07-15 DIAGNOSIS — I25.10 ASCVD (ARTERIOSCLEROTIC CARDIOVASCULAR DISEASE): Primary | ICD-10-CM

## 2020-07-15 DIAGNOSIS — E78.5 DYSLIPIDEMIA: ICD-10-CM

## 2020-07-15 PROCEDURE — 93000 ELECTROCARDIOGRAM COMPLETE: CPT | Performed by: NURSE PRACTITIONER

## 2020-07-15 PROCEDURE — 99213 OFFICE O/P EST LOW 20 MIN: CPT | Performed by: NURSE PRACTITIONER

## 2020-07-15 RX ORDER — FINASTERIDE 5 MG/1
5 TABLET, FILM COATED ORAL DAILY
COMMUNITY

## 2020-07-15 NOTE — PROGRESS NOTES
Fish Delaney MD  Adalberto Llamas  1955  07/15/2020    Patient Active Problem List   Diagnosis   • Liver cirrhosis (CMS/HCC)   • Thrombocytopenia (CMS/HCC)   • B12 deficiency   • Degenerative disc disease, cervical   • Degenerative disc disease, lumbar   • Spinal stenosis in cervical region   • Chest pain   • Other chest pain   • ASCVD (arteriosclerotic cardiovascular disease), status post stenting of the LAD June 5, 2018, clinically stable.   • Dyslipidemia   • Essential hypertension   • GERD (gastroesophageal reflux disease)   • Dyspnea on exertion       Dear Fish Delaney MD:    Subjective     Chief Complaint   Patient presents with   • Follow-up     ROUTINE 6 MONTH   • Med Management     LIST           History of Present Illness:    Adalberto Llamas is a 65 y.o. male with a history of ASCVD status post stenting of the LAD in June 2018, hypertension, and dyslipidemia.  He presents today for routine cardiology follow-up.  Previously, he had some left-sided chest pains which occurred with movement after lifting a heavy furnace.  He reports these have completely resolved.  He denies any chest pain, shortness of breath, palpitations, dizziness, or lightheadedness.  He reports his PCP obtain labs 1 month ago.  These are not available for review today.  His blood pressure is elevated.  However, he monitors this often at home and reports he has tried increasing his medication in the past but becomes hypotensive.  He reports his BP is much lower at home.          No Known Allergies:      Current Outpatient Medications:   •  allopurinol (ZYLOPRIM) 300 MG tablet, Take 300 mg by mouth Every Night., Disp: , Rfl:   •  amLODIPine (NORVASC) 5 MG tablet, Take 1 tablet by mouth Daily., Disp: 30 tablet, Rfl: 11  •  aspirin 81 MG EC tablet, Take 1 tablet by mouth Daily., Disp: 30 tablet, Rfl: 5  •  atorvastatin (LIPITOR) 40 MG tablet, TAKE 1 TABLET BY MOUTH EVERY DAY, Disp: 30 tablet, Rfl: 3  •  finasteride (PROSCAR) 5 MG  tablet, Take 5 mg by mouth Daily., Disp: , Rfl:   •  glimepiride (AMARYL) 2 MG tablet, Take 2 mg by mouth Every Night., Disp: , Rfl:   •  HYDROcodone-acetaminophen (NORCO)  MG per tablet, Take 1 tablet by mouth 3 (Three) Times a Day As Needed for Mild Pain ., Disp: , Rfl:   •  lisinopril-hydrochlorothiazide (PRINZIDE,ZESTORETIC) 20-12.5 MG per tablet, Take 1 tablet by mouth 2 (Two) Times a Day., Disp: 60 tablet, Rfl: 5  •  metFORMIN (GLUCOPHAGE) 1000 MG tablet, Take 1 tablet by mouth 2 (Two) Times a Day. Start the 6/10-because of contrast given here., Disp: 60 tablet, Rfl: 0  •  metoprolol succinate XL (TOPROL-XL) 50 MG 24 hr tablet, TAKE 1 TABLET BY MOUTH EVERY DAY, Disp: 30 tablet, Rfl: 3  •  tamsulosin (FLOMAX) 0.4 MG capsule 24 hr capsule, Take 1 capsule by mouth every night., Disp: , Rfl:   •  nitroglycerin (NITROSTAT) 0.4 MG SL tablet, Place 1 tablet under the tongue Every 5 (Five) Minutes As Needed for Chest Pain. Take no more than 3 doses in 15 minutes., Disp: 30 tablet, Rfl: 12      The following portions of the patient's history were reviewed and updated as appropriate: allergies, current medications, past family history, past medical history, past social history, past surgical history and problem list.    Social History     Tobacco Use   • Smoking status: Never Smoker   • Smokeless tobacco: Never Used   Substance Use Topics   • Alcohol use: Yes     Comment: Rare   • Drug use: No       Review of Systems   Constitution: Negative for decreased appetite and malaise/fatigue.   Cardiovascular: Negative for chest pain, dyspnea on exertion, irregular heartbeat, leg swelling, near-syncope, orthopnea, palpitations, paroxysmal nocturnal dyspnea and syncope.   Respiratory: Negative for cough, shortness of breath and wheezing.    Neurological: Negative for dizziness, light-headedness and weakness.       Objective   Vitals:    07/15/20 0844   BP: 151/72   Pulse: 63   Temp: 99.2 °F (37.3 °C)   SpO2: 99%   Weight:  "120 kg (265 lb 6.4 oz)   Height: 188 cm (74\")     Body mass index is 34.08 kg/m².        Physical Exam   Constitutional: He is oriented to person, place, and time. He appears well-developed and well-nourished.   HENT:   Head: Normocephalic and atraumatic.   Cardiovascular: Normal rate, regular rhythm and normal heart sounds. Exam reveals no S3 and no S4.   No murmur heard.  Pulmonary/Chest: Effort normal and breath sounds normal. He has no wheezes. He has no rales.   Abdominal: Soft. Bowel sounds are normal.   Musculoskeletal: He exhibits no edema.   Neurological: He is alert and oriented to person, place, and time.   Skin: Skin is warm and dry.   Psychiatric: He has a normal mood and affect. His behavior is normal.       Lab Results   Component Value Date     12/04/2019    K 4.2 12/04/2019     12/04/2019    CO2 22.2 12/04/2019    BUN 16 12/04/2019    CREATININE 0.92 12/04/2019    GLUCOSE 285 (H) 12/04/2019    CALCIUM 10.5 12/04/2019    AST 23 12/04/2019    ALT 26 12/04/2019    ALKPHOS 70 12/04/2019    LABIL2 1.6 04/23/2016     Lab Results   Component Value Date    CKTOTAL 103 12/04/2019     Lab Results   Component Value Date    WBC 7.79 12/04/2019    HGB 15.8 12/04/2019    HCT 47.3 12/04/2019     (L) 12/04/2019     Lab Results   Component Value Date    INR 0.95 04/23/2016    INR 0.96 04/19/2016     Lab Results   Component Value Date    MG 2.2 06/06/2018     Lab Results   Component Value Date    TSH 0.744 06/05/2018    PSA 0.310 06/14/2018    CHLPL 100 05/05/2014    TRIG 432 (H) 06/05/2018    HDL 28 (L) 06/05/2018    LDL  06/05/2018      Comment:      Unable to calculate      No results found for: BNP          ECG 12 Lead  Date/Time: 7/15/2020 8:42 AM  Performed by: Farzana House APRN  Authorized by: Farzana House APRN   Comparison: compared with previous ECG   Rhythm: sinus bradycardia  BPM: 59  Conduction: left bundle branch block  T inversion: III and " aVF                Assessment/Plan    Diagnosis Plan   1. ASCVD (arteriosclerotic cardiovascular disease), status post stenting of the LAD June 5, 2018, clinically stable.  ECG 12 Lead   2. Essential hypertension  ECG 12 Lead   3. Dyslipidemia  ECG 12 Lead                Recommendations:    1. Continue low dose aspirin, atorvastatin, amlodipine, lisinopril/HCTZ, and metoprolol.    2. I asked him to monitor his BP daily and let us know if readings are >140/90.    3. Follow up in 6 months or sooner if needed.         Return in about 6 months (around 1/15/2021) for Recheck.    As always, I appreciate very much the opportunity to participate in the cardiovascular care of your patients.      With Best Regards,    CHRISTY Ortiz

## 2021-02-03 ENCOUNTER — OFFICE VISIT (OUTPATIENT)
Dept: CARDIOLOGY | Facility: CLINIC | Age: 66
End: 2021-02-03

## 2021-02-03 VITALS
TEMPERATURE: 97.3 F | SYSTOLIC BLOOD PRESSURE: 157 MMHG | HEIGHT: 74 IN | BODY MASS INDEX: 33.5 KG/M2 | HEART RATE: 72 BPM | DIASTOLIC BLOOD PRESSURE: 75 MMHG | WEIGHT: 261 LBS

## 2021-02-03 DIAGNOSIS — I10 ESSENTIAL HYPERTENSION: ICD-10-CM

## 2021-02-03 DIAGNOSIS — I25.10 ASCVD (ARTERIOSCLEROTIC CARDIOVASCULAR DISEASE): Primary | ICD-10-CM

## 2021-02-03 DIAGNOSIS — E78.5 DYSLIPIDEMIA: ICD-10-CM

## 2021-02-03 PROCEDURE — 93000 ELECTROCARDIOGRAM COMPLETE: CPT | Performed by: NURSE PRACTITIONER

## 2021-02-03 PROCEDURE — 99213 OFFICE O/P EST LOW 20 MIN: CPT | Performed by: NURSE PRACTITIONER

## 2021-02-03 NOTE — PROGRESS NOTES
Fish Delaney MD  Adalberto Llamas  1955 02/03/2021    Patient Active Problem List   Diagnosis   • Liver cirrhosis (CMS/HCC)   • Thrombocytopenia (CMS/HCC)   • B12 deficiency   • Degenerative disc disease, cervical   • Degenerative disc disease, lumbar   • Spinal stenosis in cervical region   • Chest pain   • Other chest pain   • ASCVD (arteriosclerotic cardiovascular disease), status post stenting of the LAD June 5, 2018, clinically stable.   • Dyslipidemia   • Essential hypertension   • GERD (gastroesophageal reflux disease)   • Dyspnea on exertion       Dear Fish Delaney MD:    Subjective     Chief Complaint   Patient presents with   • Follow-up   • Med Management           History of Present Illness:    Adalberto Llamas is a 65 y.o. male with a history of ASCVD status post stenting of the LAD in June 2018, hypertension, and dyslipidemia.  He presents today for routine cardiology follow-up.  He reports he is doing very well.  He denies any chest pain, shortness of breath, palpitations, dizziness, or lightheadedness.  His blood pressure is elevated in the office today.  It has been recommended several times to make adjustments to antihypertensives.  However, the patient is adamant this will cause hypotension thus his blood pressure has been marginally controlled.  He reports he recently had labs completed at his PCPs office.          Allergies   Allergen Reactions   • Penicillins Unknown - High Severity   :      Current Outpatient Medications:   •  allopurinol (ZYLOPRIM) 300 MG tablet, Take 300 mg by mouth Every Night., Disp: , Rfl:   •  amLODIPine (NORVASC) 5 MG tablet, Take 1 tablet by mouth Daily., Disp: 30 tablet, Rfl: 11  •  aspirin 81 MG EC tablet, Take 1 tablet by mouth Daily., Disp: 30 tablet, Rfl: 5  •  atorvastatin (LIPITOR) 40 MG tablet, TAKE 1 TABLET BY MOUTH EVERY DAY, Disp: 30 tablet, Rfl: 3  •  finasteride (PROSCAR) 5 MG tablet, Take 5 mg by mouth Daily., Disp: , Rfl:   •  glimepiride  "(AMARYL) 2 MG tablet, Take 2 mg by mouth Every Night., Disp: , Rfl:   •  HYDROcodone-acetaminophen (NORCO)  MG per tablet, Take 1 tablet by mouth 3 (Three) Times a Day As Needed for Mild Pain ., Disp: , Rfl:   •  lisinopril-hydrochlorothiazide (PRINZIDE,ZESTORETIC) 20-12.5 MG per tablet, Take 1 tablet by mouth 2 (Two) Times a Day., Disp: 60 tablet, Rfl: 5  •  metFORMIN (GLUCOPHAGE) 1000 MG tablet, Take 1 tablet by mouth 2 (Two) Times a Day. Start the 6/10-because of contrast given here., Disp: 60 tablet, Rfl: 0  •  metoprolol succinate XL (TOPROL-XL) 50 MG 24 hr tablet, TAKE 1 TABLET BY MOUTH EVERY DAY, Disp: 30 tablet, Rfl: 3  •  nitroglycerin (NITROSTAT) 0.4 MG SL tablet, Place 1 tablet under the tongue Every 5 (Five) Minutes As Needed for Chest Pain. Take no more than 3 doses in 15 minutes., Disp: 30 tablet, Rfl: 12  •  tamsulosin (FLOMAX) 0.4 MG capsule 24 hr capsule, Take 1 capsule by mouth every night., Disp: , Rfl:       The following portions of the patient's history were reviewed and updated as appropriate: allergies, current medications, past family history, past medical history, past social history, past surgical history and problem list.    Social History     Tobacco Use   • Smoking status: Never Smoker   • Smokeless tobacco: Never Used   Substance Use Topics   • Alcohol use: Yes     Comment: Rare   • Drug use: No       Review of Systems   Constitution: Negative for decreased appetite and malaise/fatigue.   Cardiovascular: Negative for chest pain, dyspnea on exertion, irregular heartbeat, leg swelling, near-syncope, orthopnea, palpitations, paroxysmal nocturnal dyspnea and syncope.   Respiratory: Negative for cough, shortness of breath and wheezing.    Neurological: Negative for dizziness, light-headedness and weakness.       Objective   Vitals:    02/03/21 1059   BP: 157/75   Pulse: 72   Temp: 97.3 °F (36.3 °C)   Weight: 118 kg (261 lb)   Height: 188 cm (74\")     Body mass index is 33.51 " kg/m².        Vitals signs reviewed.   Constitutional:       Appearance: Healthy appearance. Well-developed and not in distress.   HENT:      Head: Normocephalic and atraumatic.   Pulmonary:      Effort: Pulmonary effort is normal.      Breath sounds: Normal breath sounds. No wheezing. No rales.   Cardiovascular:      Normal rate. Regular rhythm.      Murmurs: There is no murmur.      . No S3 and S4 gallop.   Edema:     Peripheral edema absent.   Abdominal:      General: Bowel sounds are normal.      Palpations: Abdomen is soft.   Skin:     General: Skin is warm and dry.   Neurological:      Mental Status: Alert, oriented to person, place, and time and oriented to person, place and time.   Psychiatric:         Mood and Affect: Mood normal.         Behavior: Behavior normal.         Lab Results   Component Value Date     12/04/2019    K 4.2 12/04/2019     12/04/2019    CO2 22.2 12/04/2019    BUN 16 12/04/2019    CREATININE 0.92 12/04/2019    GLUCOSE 285 (H) 12/04/2019    CALCIUM 10.5 12/04/2019    AST 23 12/04/2019    ALT 26 12/04/2019    ALKPHOS 70 12/04/2019    LABIL2 1.6 04/23/2016     Lab Results   Component Value Date    CKTOTAL 103 12/04/2019     Lab Results   Component Value Date    WBC 7.79 12/04/2019    HGB 15.8 12/04/2019    HCT 47.3 12/04/2019     (L) 12/04/2019     Lab Results   Component Value Date    INR 0.95 04/23/2016    INR 0.96 04/19/2016     Lab Results   Component Value Date    MG 2.2 06/06/2018     Lab Results   Component Value Date    TSH 0.744 06/05/2018    PSA 0.310 06/14/2018    CHLPL 100 05/05/2014    TRIG 432 (H) 06/05/2018    HDL 28 (L) 06/05/2018    LDL  06/05/2018      Comment:      Unable to calculate               ECG 12 Lead    Date/Time: 2/3/2021 10:59 AM  Performed by: Farzana House APRN  Authorized by: Farzana House APRN   Comparison: compared with previous ECG   Similar to previous ECG  Rhythm: sinus rhythm  BPM: 66  Conduction: left bundle branch  block              Assessment/Plan    Diagnosis Plan   1. ASCVD (arteriosclerotic cardiovascular disease), status post stenting of the LAD June 5, 2018, clinically stable.  ECG 12 Lead   2. Essential hypertension  ECG 12 Lead   3. Dyslipidemia  ECG 12 Lead                Recommendations:    1.  Continue low-dose aspirin, amlodipine, lisinopril/HCTZ, and metoprolol.    2.  We will obtain recent labs from his PCP.    3.  Follow-up in 6 months or sooner if needed.     Return in about 6 months (around 8/3/2021) for Recheck.    As always, I appreciate very much the opportunity to participate in the cardiovascular care of your patients.      With Best Regards,    CHRISTY Ortiz

## 2021-02-04 ENCOUNTER — TELEPHONE (OUTPATIENT)
Dept: CARDIOLOGY | Facility: CLINIC | Age: 66
End: 2021-02-04

## 2021-02-04 NOTE — TELEPHONE ENCOUNTER
Requested labs.         ----- Message from CHRISTY Ortiz sent at 2/3/2021 11:33 AM EST -----  Please get a copy of most recent labs

## 2021-02-22 DIAGNOSIS — Z23 IMMUNIZATION DUE: ICD-10-CM

## 2021-07-19 ENCOUNTER — LAB (OUTPATIENT)
Dept: LAB | Facility: HOSPITAL | Age: 66
End: 2021-07-19

## 2021-07-19 ENCOUNTER — TRANSCRIBE ORDERS (OUTPATIENT)
Dept: ADMINISTRATIVE | Facility: HOSPITAL | Age: 66
End: 2021-07-19

## 2021-07-19 ENCOUNTER — HOSPITAL ENCOUNTER (OUTPATIENT)
Dept: GENERAL RADIOLOGY | Facility: HOSPITAL | Age: 66
Discharge: HOME | End: 2021-07-19

## 2021-07-19 DIAGNOSIS — R05.9 COUGH: ICD-10-CM

## 2021-07-19 DIAGNOSIS — J01.00 ACUTE MAXILLARY SINUSITIS, RECURRENCE NOT SPECIFIED: ICD-10-CM

## 2021-07-19 DIAGNOSIS — J01.00 ACUTE MAXILLARY SINUSITIS, RECURRENCE NOT SPECIFIED: Primary | ICD-10-CM

## 2021-07-19 PROCEDURE — 87205 SMEAR GRAM STAIN: CPT

## 2021-07-19 PROCEDURE — 71046 X-RAY EXAM CHEST 2 VIEWS: CPT

## 2021-07-19 PROCEDURE — 87070 CULTURE OTHR SPECIMN AEROBIC: CPT

## 2021-07-19 PROCEDURE — 87186 SC STD MICRODIL/AGAR DIL: CPT

## 2021-07-19 PROCEDURE — 87147 CULTURE TYPE IMMUNOLOGIC: CPT

## 2021-07-19 PROCEDURE — 71046 X-RAY EXAM CHEST 2 VIEWS: CPT | Performed by: RADIOLOGY

## 2021-07-23 LAB
BACTERIA SPEC AEROBE CULT: ABNORMAL
BACTERIA SPEC AEROBE CULT: ABNORMAL
GRAM STN SPEC: ABNORMAL
GRAM STN SPEC: ABNORMAL

## 2021-10-20 ENCOUNTER — OFFICE VISIT (OUTPATIENT)
Dept: CARDIOLOGY | Facility: CLINIC | Age: 66
End: 2021-10-20

## 2021-10-20 VITALS
HEIGHT: 74 IN | WEIGHT: 261 LBS | SYSTOLIC BLOOD PRESSURE: 132 MMHG | DIASTOLIC BLOOD PRESSURE: 67 MMHG | HEART RATE: 63 BPM | OXYGEN SATURATION: 97 % | BODY MASS INDEX: 33.5 KG/M2 | TEMPERATURE: 97 F

## 2021-10-20 DIAGNOSIS — I25.10 ASCVD (ARTERIOSCLEROTIC CARDIOVASCULAR DISEASE): Primary | ICD-10-CM

## 2021-10-20 DIAGNOSIS — E78.5 DYSLIPIDEMIA: ICD-10-CM

## 2021-10-20 DIAGNOSIS — I10 ESSENTIAL HYPERTENSION: ICD-10-CM

## 2021-10-20 PROCEDURE — 99213 OFFICE O/P EST LOW 20 MIN: CPT | Performed by: PHYSICIAN ASSISTANT

## 2021-10-20 PROCEDURE — 93000 ELECTROCARDIOGRAM COMPLETE: CPT | Performed by: PHYSICIAN ASSISTANT

## 2021-10-20 RX ORDER — SILDENAFIL 25 MG/1
25 TABLET, FILM COATED ORAL DAILY PRN
Qty: 30 TABLET | Refills: 2 | Status: SHIPPED | OUTPATIENT
Start: 2021-10-20 | End: 2022-10-20 | Stop reason: ALTCHOICE

## 2021-10-20 NOTE — PROGRESS NOTES
Fish Delaney MD  Adalberto Llamas  1955  10/20/2021    Patient Active Problem List   Diagnosis   • Liver cirrhosis (HCC)   • Thrombocytopenia (HCC)   • B12 deficiency   • Degenerative disc disease, cervical   • Degenerative disc disease, lumbar   • Spinal stenosis in cervical region   • Chest pain   • Other chest pain   • ASCVD (arteriosclerotic cardiovascular disease), status post stenting of the LAD June 5, 2018, clinically stable.   • Dyslipidemia   • Essential hypertension   • GERD (gastroesophageal reflux disease)   • Dyspnea on exertion       Dear Fish Delaney MD:    Subjective     History of Present Illness:    Chief Complaint   Patient presents with   • Med Management     verbal.        Adalberto Llamas is a pleasant 66 y.o. male with a past medical history significant for ASCVD status post stenting of the LAD in June 2018, hypertension, and dyslipidemia.  He presents today for routine cardiology follow-up.     Adalberto reports that he has been doing well. Patient denies any chest pain, shortness of breath, palpitations, dizziness, syncope or near syncope. His blood pressure has been well controlled as well.     Allergies   Allergen Reactions   • Penicillins Unknown - High Severity   :      Current Outpatient Medications:   •  allopurinol (ZYLOPRIM) 300 MG tablet, Take 300 mg by mouth Every Night., Disp: , Rfl:   •  amLODIPine (NORVASC) 5 MG tablet, Take 1 tablet by mouth Daily., Disp: 30 tablet, Rfl: 11  •  aspirin 81 MG EC tablet, Take 1 tablet by mouth Daily., Disp: 30 tablet, Rfl: 5  •  atorvastatin (LIPITOR) 40 MG tablet, TAKE 1 TABLET BY MOUTH EVERY DAY, Disp: 30 tablet, Rfl: 3  •  finasteride (PROSCAR) 5 MG tablet, Take 5 mg by mouth Daily., Disp: , Rfl:   •  glimepiride (AMARYL) 2 MG tablet, Take 2 mg by mouth Every Night., Disp: , Rfl:   •  HYDROcodone-acetaminophen (NORCO)  MG per tablet, Take 1 tablet by mouth 3 (Three) Times a Day As Needed for Mild Pain ., Disp: , Rfl:   •   "lisinopril-hydrochlorothiazide (PRINZIDE,ZESTORETIC) 20-12.5 MG per tablet, Take 1 tablet by mouth 2 (Two) Times a Day., Disp: 60 tablet, Rfl: 5  •  metFORMIN (GLUCOPHAGE) 1000 MG tablet, Take 1 tablet by mouth 2 (Two) Times a Day. Start the 6/10-because of contrast given here., Disp: 60 tablet, Rfl: 0  •  metoprolol succinate XL (TOPROL-XL) 50 MG 24 hr tablet, TAKE 1 TABLET BY MOUTH EVERY DAY, Disp: 30 tablet, Rfl: 3  •  tamsulosin (FLOMAX) 0.4 MG capsule 24 hr capsule, Take 1 capsule by mouth every night., Disp: , Rfl:   •  sildenafil (Viagra) 25 MG tablet, Take 1 tablet by mouth Daily As Needed for Erectile Dysfunction., Disp: 30 tablet, Rfl: 2    The following portions of the patient's history were reviewed and updated as appropriate: allergies, current medications, past family history, past medical history, past social history, past surgical history and problem list.    Social History     Tobacco Use   • Smoking status: Never Smoker   • Smokeless tobacco: Never Used   Substance Use Topics   • Alcohol use: Yes     Comment: Rare   • Drug use: No         Objective   Vitals:    10/20/21 0906   BP: 132/67   BP Location: Left arm   Patient Position: Sitting   Cuff Size: Adult   Pulse: 63   Temp: 97 °F (36.1 °C)   TempSrc: Infrared   SpO2: 97%   Weight: 118 kg (261 lb)   Height: 188 cm (74\")     Body mass index is 33.51 kg/m².    Constitutional:       General: Not in acute distress.     Appearance: Healthy appearance. Well-developed and not in distress. Not diaphoretic.   Eyes:      Conjunctiva/sclera: Conjunctivae normal.      Pupils: Pupils are equal, round, and reactive to light.   HENT:      Head: Normocephalic and atraumatic.   Neck:      Vascular: No carotid bruit or JVD.   Pulmonary:      Effort: Pulmonary effort is normal. No respiratory distress.      Breath sounds: Normal breath sounds.   Cardiovascular:      Normal rate. Regular rhythm.   Skin:     General: Skin is cool.   Neurological:      Mental Status: " Alert, oriented to person, place, and time and oriented to person, place and time.         Lab Results   Component Value Date     12/04/2019    K 4.2 12/04/2019     12/04/2019    CO2 22.2 12/04/2019    BUN 16 12/04/2019    CREATININE 0.92 12/04/2019    GLUCOSE 285 (H) 12/04/2019    CALCIUM 10.5 12/04/2019    AST 23 12/04/2019    ALT 26 12/04/2019    ALKPHOS 70 12/04/2019    LABIL2 1.6 04/23/2016     Lab Results   Component Value Date    CKTOTAL 103 12/04/2019     Lab Results   Component Value Date    WBC 7.79 12/04/2019    HGB 15.8 12/04/2019    HCT 47.3 12/04/2019     (L) 12/04/2019     Lab Results   Component Value Date    INR 0.95 04/23/2016    INR 0.96 04/19/2016     Lab Results   Component Value Date    MG 2.2 06/06/2018     Lab Results   Component Value Date    TSH 0.744 06/05/2018    PSA 0.310 06/14/2018    CHLPL 100 05/05/2014    TRIG 432 (H) 06/05/2018    HDL 28 (L) 06/05/2018    LDL  06/05/2018      Comment:      Unable to calculate      No results found for: BNP    During this visit the following were done:  Labs Reviewed []    Labs Ordered []    Radiology Reports Reviewed []    Radiology Ordered []    PCP Records Reviewed []    Referring Provider Records Reviewed []    ER Records Reviewed []    Hospital Records Reviewed []    History Obtained From Family []    Radiology Images Reviewed []    Other Reviewed []    Records Requested []         ECG 12 Lead    Date/Time: 10/20/2021 9:03 AM  Performed by: Eric Petit PA-C  Authorized by: Eric Petit PA-C   Comparison: compared with previous ECG   Similar to previous ECG  Rhythm: sinus rhythm  Conduction: left bundle branch block    Clinical impression: non-specific ECG            Assessment/Plan    Diagnosis Plan   1. ASCVD (arteriosclerotic cardiovascular disease), status post stenting of the LAD June 5, 2018, clinically stable.     2. Dyslipidemia     3. Essential hypertension               Recommendations:  1. ASCVD  1. Clinically stable, no recent anginal symptoms. Continue aspirin, lipitor, metoprolol succinate.   2. Essential hypertension  1. Currently controlled continue regimen mentioned above as well as amlodipine.       No follow-ups on file.    As always, I appreciate very much the opportunity to participate in the cardiovascular care of your patients.      With Best Regards,    Eric Petit PA-C

## 2021-10-21 ENCOUNTER — TELEPHONE (OUTPATIENT)
Dept: CARDIOLOGY | Facility: CLINIC | Age: 66
End: 2021-10-21

## 2021-10-21 NOTE — TELEPHONE ENCOUNTER
----- Message from Eric Petit PA-C sent at 10/20/2021  9:20 AM EDT -----  Can we get recent blood work from pcp?     REQUESTED

## 2022-06-16 ENCOUNTER — TRANSCRIBE ORDERS (OUTPATIENT)
Dept: ADMINISTRATIVE | Facility: HOSPITAL | Age: 67
End: 2022-06-16

## 2022-06-16 DIAGNOSIS — R10.84 ABDOMINAL PAIN, GENERALIZED: Primary | ICD-10-CM

## 2022-07-13 ENCOUNTER — LAB (OUTPATIENT)
Dept: LAB | Facility: HOSPITAL | Age: 67
End: 2022-07-13

## 2022-07-13 ENCOUNTER — TRANSCRIBE ORDERS (OUTPATIENT)
Dept: ADMINISTRATIVE | Facility: HOSPITAL | Age: 67
End: 2022-07-13

## 2022-07-13 DIAGNOSIS — J01.01 RECURRENT MAXILLARY SINUSITIS: ICD-10-CM

## 2022-07-13 DIAGNOSIS — J01.01 RECURRENT MAXILLARY SINUSITIS: Primary | ICD-10-CM

## 2022-07-13 PROCEDURE — 87186 SC STD MICRODIL/AGAR DIL: CPT

## 2022-07-13 PROCEDURE — 87070 CULTURE OTHR SPECIMN AEROBIC: CPT

## 2022-07-13 PROCEDURE — 87205 SMEAR GRAM STAIN: CPT

## 2022-07-14 ENCOUNTER — HOSPITAL ENCOUNTER (OUTPATIENT)
Dept: CT IMAGING | Facility: HOSPITAL | Age: 67
Discharge: HOME | End: 2022-07-14
Admitting: INTERNAL MEDICINE

## 2022-07-14 DIAGNOSIS — R10.84 ABDOMINAL PAIN, GENERALIZED: ICD-10-CM

## 2022-07-14 LAB — CREAT BLDA-MCNC: 0.7 MG/DL (ref 0.6–1.3)

## 2022-07-14 PROCEDURE — 25010000002 IOPAMIDOL 61 % SOLUTION: Performed by: INTERNAL MEDICINE

## 2022-07-14 PROCEDURE — 74177 CT ABD & PELVIS W/CONTRAST: CPT

## 2022-07-14 PROCEDURE — 82565 ASSAY OF CREATININE: CPT

## 2022-07-14 PROCEDURE — 74177 CT ABD & PELVIS W/CONTRAST: CPT | Performed by: RADIOLOGY

## 2022-07-14 RX ADMIN — IOPAMIDOL 100 ML: 612 INJECTION, SOLUTION INTRAVENOUS at 13:50

## 2022-07-16 LAB
BACTERIA SPEC AEROBE CULT: ABNORMAL
BACTERIA SPEC AEROBE CULT: ABNORMAL
GRAM STN SPEC: ABNORMAL

## 2022-08-22 ENCOUNTER — TELEPHONE (OUTPATIENT)
Dept: CARDIOLOGY | Facility: CLINIC | Age: 67
End: 2022-08-22

## 2022-08-22 NOTE — TELEPHONE ENCOUNTER
Patient was called to/r/s appt due to provider being out.  Patient's wife states he has been having chest pain similar to a previous MI.  Patient advised to go to the ER.  His wife states he refused stating it would cost him over $2000.  He was advised that despite the cost it was very important to go to the ER .      He was given a new appt

## 2022-08-23 ENCOUNTER — TELEPHONE (OUTPATIENT)
Dept: CARDIOLOGY | Facility: CLINIC | Age: 67
End: 2022-08-23

## 2022-08-29 ENCOUNTER — OFFICE VISIT (OUTPATIENT)
Dept: CARDIOLOGY | Facility: CLINIC | Age: 67
End: 2022-08-29

## 2022-08-29 VITALS
SYSTOLIC BLOOD PRESSURE: 141 MMHG | DIASTOLIC BLOOD PRESSURE: 74 MMHG | BODY MASS INDEX: 32.47 KG/M2 | WEIGHT: 253 LBS | HEIGHT: 74 IN | HEART RATE: 66 BPM

## 2022-08-29 DIAGNOSIS — E78.5 DYSLIPIDEMIA: ICD-10-CM

## 2022-08-29 DIAGNOSIS — R06.09 DYSPNEA ON EXERTION: ICD-10-CM

## 2022-08-29 DIAGNOSIS — I10 ESSENTIAL HYPERTENSION: ICD-10-CM

## 2022-08-29 DIAGNOSIS — I25.10 ASCVD (ARTERIOSCLEROTIC CARDIOVASCULAR DISEASE): Primary | ICD-10-CM

## 2022-08-29 PROCEDURE — 99214 OFFICE O/P EST MOD 30 MIN: CPT | Performed by: NURSE PRACTITIONER

## 2022-08-29 PROCEDURE — 93000 ELECTROCARDIOGRAM COMPLETE: CPT | Performed by: NURSE PRACTITIONER

## 2022-08-29 NOTE — PROGRESS NOTES
"Fish Delaney MD  Adalberto Llamas  1955 08/29/2022    Patient Active Problem List   Diagnosis   • Liver cirrhosis (HCC)   • Thrombocytopenia (HCC)   • B12 deficiency   • Degenerative disc disease, cervical   • Degenerative disc disease, lumbar   • Spinal stenosis in cervical region   • Chest pain   • Other chest pain   • ASCVD (arteriosclerotic cardiovascular disease), status post stenting of the LAD June 5, 2018, clinically stable.   • Dyslipidemia   • Essential hypertension   • GERD (gastroesophageal reflux disease)   • Dyspnea on exertion       Dear Fish Delaney MD:    Subjective     Chief Complaint   Patient presents with   • Follow-up   • Chest Pain     INTERMITTENT-WITH BILATERAL ARM NUMBNESS           History of Present Illness:    Adalberto Llamas is a 67 y.o. male with a past medical history of ASCVD status post stenting of the LAD in June 2018, hypertension, and dyslipidemia.  He presents today for cardiology follow up. Over the past few months he has had diffuse joint pain. He has chronic bilateral arm paresthesias present since carpal tunnel surgery. He denies any leg or arm pain. He does report his podiatrist ordered testing and told him his circulation was okay, but he is unsure what test this was. He has some intermittent chest discomfort in the left chest that is infrequent and brief. His main concern is worsening fatigue. He feels \"worn out\" when he tries to complete any type of physical activity. He becomes short of breath with mild exertion. He states these symptoms are not similar to his anginal symptoms prior to PCI. His PCP has recently obtained a lab workup. He has an appointment tomorrow to discuss results.  He denies any palpitations, dizziness, lightheadedness, near-syncope, or syncope.          Allergies   Allergen Reactions   • Penicillins Unknown - High Severity   :      Current Outpatient Medications:   •  allopurinol (ZYLOPRIM) 300 MG tablet, Take 300 mg by mouth Every Night., " Disp: , Rfl:   •  amLODIPine (NORVASC) 5 MG tablet, Take 1 tablet by mouth Daily., Disp: 30 tablet, Rfl: 11  •  aspirin 81 MG EC tablet, Take 1 tablet by mouth Daily., Disp: 30 tablet, Rfl: 5  •  atorvastatin (LIPITOR) 40 MG tablet, TAKE 1 TABLET BY MOUTH EVERY DAY, Disp: 30 tablet, Rfl: 3  •  finasteride (PROSCAR) 5 MG tablet, Take 5 mg by mouth Daily., Disp: , Rfl:   •  glimepiride (AMARYL) 2 MG tablet, Take 2 mg by mouth Every Night., Disp: , Rfl:   •  HYDROcodone-acetaminophen (NORCO)  MG per tablet, Take 1 tablet by mouth 3 (Three) Times a Day As Needed for Mild Pain ., Disp: , Rfl:   •  lisinopril-hydrochlorothiazide (PRINZIDE,ZESTORETIC) 20-12.5 MG per tablet, Take 1 tablet by mouth 2 (Two) Times a Day., Disp: 60 tablet, Rfl: 5  •  metoprolol succinate XL (TOPROL-XL) 50 MG 24 hr tablet, TAKE 1 TABLET BY MOUTH EVERY DAY, Disp: 30 tablet, Rfl: 3  •  sildenafil (Viagra) 25 MG tablet, Take 1 tablet by mouth Daily As Needed for Erectile Dysfunction., Disp: 30 tablet, Rfl: 2  •  tamsulosin (FLOMAX) 0.4 MG capsule 24 hr capsule, Take 1 capsule by mouth every night., Disp: , Rfl:   •  metFORMIN (GLUCOPHAGE) 1000 MG tablet, Take 1 tablet by mouth 2 (Two) Times a Day. Start the 6/10-because of contrast given here., Disp: 60 tablet, Rfl: 0      The following portions of the patient's history were reviewed and updated as appropriate: allergies, current medications, past family history, past medical history, past social history, past surgical history and problem list.    Social History     Tobacco Use   • Smoking status: Never Smoker   • Smokeless tobacco: Never Used   Substance Use Topics   • Alcohol use: Yes     Comment: Rare   • Drug use: No       Review of Systems   Constitutional: Positive for malaise/fatigue. Negative for decreased appetite.   Cardiovascular: Positive for chest pain and dyspnea on exertion. Negative for palpitations.   Respiratory: Negative for cough and shortness of breath.        Objective  "  Vitals:    08/29/22 1055   BP: 141/74   Pulse: 66   Weight: 115 kg (253 lb)   Height: 188 cm (74\")     Body mass index is 32.48 kg/m².        Vitals reviewed.   Constitutional:       Appearance: Healthy appearance. Well-developed and not in distress.   HENT:      Head: Normocephalic and atraumatic.   Pulmonary:      Effort: Pulmonary effort is normal.      Breath sounds: Normal breath sounds. No wheezing. No rales.   Cardiovascular:      Normal rate. Regular rhythm.      Murmurs: There is no murmur.      . No S3 and S4 gallop.   Pulses:     Radial: 2+ bilaterally.     Dorsalis pedis: 1+ bilaterally.     Posterior tibial: 1+ bilaterally.  Edema:     Peripheral edema absent.   Abdominal:      General: Bowel sounds are normal.      Palpations: Abdomen is soft.   Skin:     General: Skin is warm and dry.   Neurological:      Mental Status: Alert, oriented to person, place, and time and oriented to person, place and time.   Psychiatric:         Mood and Affect: Mood normal.         Behavior: Behavior normal.         Lab Results   Component Value Date     12/04/2019    K 4.2 12/04/2019     12/04/2019    CO2 22.2 12/04/2019    BUN 16 12/04/2019    CREATININE 0.70 07/14/2022    GLUCOSE 285 (H) 12/04/2019    CALCIUM 10.5 12/04/2019    AST 23 12/04/2019    ALT 26 12/04/2019    ALKPHOS 70 12/04/2019    LABIL2 1.6 04/23/2016     Lab Results   Component Value Date    CKTOTAL 103 12/04/2019     Lab Results   Component Value Date    WBC 7.79 12/04/2019    HGB 15.8 12/04/2019    HCT 47.3 12/04/2019     (L) 12/04/2019     Lab Results   Component Value Date    INR 0.95 04/23/2016    INR 0.96 04/19/2016     Lab Results   Component Value Date    MG 2.2 06/06/2018     Lab Results   Component Value Date    TSH 0.744 06/05/2018    PSA 0.310 06/14/2018    CHLPL 100 05/05/2014    TRIG 432 (H) 06/05/2018    HDL 28 (L) 06/05/2018    LDL  06/05/2018      Comment:      Unable to calculate      No results found for: " BNP          ECG 12 Lead    Date/Time: 8/29/2022 10:52 AM  Performed by: Farzana House APRN  Authorized by: Farzana House APRN   Comparison: compared with previous ECG   Similar to previous ECG  Rhythm: sinus rhythm  BPM: 66  Conduction: left bundle branch block              Assessment & Plan    Diagnosis Plan   1. ASCVD (arteriosclerotic cardiovascular disease), status post stenting of the LAD June 5, 2018, clinically stable.  ECG 12 Lead    Stress Test With Myocardial Perfusion One Day    Adult Transthoracic Echo Complete W/ Cont if Necessary Per Protocol   2. Essential hypertension  ECG 12 Lead    Stress Test With Myocardial Perfusion One Day    Adult Transthoracic Echo Complete W/ Cont if Necessary Per Protocol   3. Dyslipidemia  ECG 12 Lead    Stress Test With Myocardial Perfusion One Day    Adult Transthoracic Echo Complete W/ Cont if Necessary Per Protocol   4. Dyspnea on exertion  ECG 12 Lead    Stress Test With Myocardial Perfusion One Day    Adult Transthoracic Echo Complete W/ Cont if Necessary Per Protocol                Recommendations:    1. ASCVD - recent chest pains and exertional dyspnea. Will order an echocardiogram and stress test to evaluate further. Continue low dose aspirin, atorvastatin, and metoprolol. I did ask him to go to the ER if he has any persistent chest pains. He was agreeable.   2. Essential hypertension - continue amlodipine.  3. Dyslipidemia - on statin therapy. LDL is 47.   4. Follow up in 6 weeks or sooner if needed.         Return in about 6 weeks (around 10/10/2022) for Recheck.    As always, I appreciate very much the opportunity to participate in the cardiovascular care of your patients.      With Best Regards,    CHRISTY Ortiz

## 2022-10-14 ENCOUNTER — HOSPITAL ENCOUNTER (OUTPATIENT)
Dept: NUCLEAR MEDICINE | Facility: HOSPITAL | Age: 67
Discharge: HOME | End: 2022-10-14

## 2022-10-14 ENCOUNTER — HOSPITAL ENCOUNTER (OUTPATIENT)
Dept: CARDIOLOGY | Facility: HOSPITAL | Age: 67
Discharge: HOME | End: 2022-10-14

## 2022-10-14 DIAGNOSIS — E78.5 DYSLIPIDEMIA: ICD-10-CM

## 2022-10-14 DIAGNOSIS — I25.10 ASCVD (ARTERIOSCLEROTIC CARDIOVASCULAR DISEASE): ICD-10-CM

## 2022-10-14 DIAGNOSIS — I10 ESSENTIAL HYPERTENSION: ICD-10-CM

## 2022-10-14 DIAGNOSIS — R06.09 DYSPNEA ON EXERTION: ICD-10-CM

## 2022-10-14 LAB
BH CV NUCLEAR PRIOR STUDY: 3
BH CV REST NUCLEAR ISOTOPE DOSE: 9.8 MCI
BH CV STRESS BP STAGE 1: NORMAL
BH CV STRESS COMMENTS STAGE 1: NORMAL
BH CV STRESS DOSE REGADENOSON STAGE 1: 0.4
BH CV STRESS DURATION MIN STAGE 1: 0
BH CV STRESS DURATION SEC STAGE 1: 10
BH CV STRESS HR STAGE 1: 75
BH CV STRESS NUCLEAR ISOTOPE DOSE: 29.8 MCI
BH CV STRESS PROTOCOL 1: NORMAL
BH CV STRESS RECOVERY BP: NORMAL MMHG
BH CV STRESS RECOVERY HR: 72 BPM
BH CV STRESS STAGE 1: 1
LV EF NUC BP: 52 %
MAXIMAL PREDICTED HEART RATE: 151 BPM
PERCENT MAX PREDICTED HR: 49.67 %
STRESS BASELINE BP: NORMAL MMHG
STRESS BASELINE HR: 64 BPM
STRESS PERCENT HR: 58 %
STRESS POST PEAK BP: NORMAL MMHG
STRESS POST PEAK HR: 75 BPM
STRESS TARGET HR: 128 BPM

## 2022-10-14 PROCEDURE — 78452 HT MUSCLE IMAGE SPECT MULT: CPT | Performed by: INTERNAL MEDICINE

## 2022-10-14 PROCEDURE — A9500 TC99M SESTAMIBI: HCPCS | Performed by: NURSE PRACTITIONER

## 2022-10-14 PROCEDURE — 93017 CV STRESS TEST TRACING ONLY: CPT

## 2022-10-14 PROCEDURE — 78452 HT MUSCLE IMAGE SPECT MULT: CPT

## 2022-10-14 PROCEDURE — 0 TECHNETIUM SESTAMIBI: Performed by: NURSE PRACTITIONER

## 2022-10-14 PROCEDURE — 93306 TTE W/DOPPLER COMPLETE: CPT

## 2022-10-14 PROCEDURE — 93306 TTE W/DOPPLER COMPLETE: CPT | Performed by: INTERNAL MEDICINE

## 2022-10-14 PROCEDURE — 25010000002 REGADENOSON 0.4 MG/5ML SOLUTION: Performed by: NURSE PRACTITIONER

## 2022-10-14 PROCEDURE — 93018 CV STRESS TEST I&R ONLY: CPT | Performed by: INTERNAL MEDICINE

## 2022-10-14 RX ADMIN — REGADENOSON 0.4 MG: 0.08 INJECTION, SOLUTION INTRAVENOUS at 10:42

## 2022-10-14 RX ADMIN — TECHNETIUM TC 99M SESTAMIBI 1 DOSE: 1 INJECTION INTRAVENOUS at 10:42

## 2022-10-14 RX ADMIN — TECHNETIUM TC 99M SESTAMIBI 1 DOSE: 1 INJECTION INTRAVENOUS at 09:27

## 2022-10-17 LAB
BH CV ECHO MEAS - ACS: 2 CM
BH CV ECHO MEAS - AO MAX PG: 20.3 MMHG
BH CV ECHO MEAS - AO MEAN PG: 10 MMHG
BH CV ECHO MEAS - AO ROOT DIAM: 3.9 CM
BH CV ECHO MEAS - AO V2 MAX: 225 CM/SEC
BH CV ECHO MEAS - AO V2 VTI: 51 CM
BH CV ECHO MEAS - AVA(I,D): 2.8 CM2
BH CV ECHO MEAS - EDV(CUBED): 204.9 ML
BH CV ECHO MEAS - EDV(MOD-SP4): 181 ML
BH CV ECHO MEAS - EF(MOD-SP4): 63.9 %
BH CV ECHO MEAS - ESV(CUBED): 61.9 ML
BH CV ECHO MEAS - ESV(MOD-SP4): 65.4 ML
BH CV ECHO MEAS - FS: 32.9 %
BH CV ECHO MEAS - IVS/LVPW: 0.98 CM
BH CV ECHO MEAS - IVSD: 1.42 CM
BH CV ECHO MEAS - LA DIMENSION: 3.2 CM
BH CV ECHO MEAS - LAT PEAK E' VEL: 10 CM/SEC
BH CV ECHO MEAS - LV DIASTOLIC VOL/BSA (35-75): 75.4 CM2
BH CV ECHO MEAS - LV MASS(C)D: 391.4 GRAMS
BH CV ECHO MEAS - LV MAX PG: 4.4 MMHG
BH CV ECHO MEAS - LV MEAN PG: 2 MMHG
BH CV ECHO MEAS - LV SYSTOLIC VOL/BSA (12-30): 27.2 CM2
BH CV ECHO MEAS - LV V1 MAX: 105 CM/SEC
BH CV ECHO MEAS - LV V1 VTI: 23.3 CM
BH CV ECHO MEAS - LVIDD: 5.9 CM
BH CV ECHO MEAS - LVIDS: 4 CM
BH CV ECHO MEAS - LVOT AREA: 6.2 CM2
BH CV ECHO MEAS - LVOT DIAM: 2.8 CM
BH CV ECHO MEAS - LVPWD: 1.46 CM
BH CV ECHO MEAS - MED PEAK E' VEL: 6.4 CM/SEC
BH CV ECHO MEAS - MV A MAX VEL: 116 CM/SEC
BH CV ECHO MEAS - MV E MAX VEL: 124 CM/SEC
BH CV ECHO MEAS - MV E/A: 1.07
BH CV ECHO MEAS - PA ACC TIME: 0.09 SEC
BH CV ECHO MEAS - PA PR(ACCEL): 39.4 MMHG
BH CV ECHO MEAS - SI(MOD-SP4): 48.1 ML/M2
BH CV ECHO MEAS - SV(LVOT): 143.5 ML
BH CV ECHO MEAS - SV(MOD-SP4): 115.6 ML
BH CV ECHO MEASUREMENTS AVERAGE E/E' RATIO: 15.12
MAXIMAL PREDICTED HEART RATE: 151 BPM
STRESS TARGET HR: 128 BPM

## 2022-10-20 ENCOUNTER — OFFICE VISIT (OUTPATIENT)
Dept: CARDIOLOGY | Facility: CLINIC | Age: 67
End: 2022-10-20

## 2022-10-20 VITALS
HEART RATE: 70 BPM | WEIGHT: 250 LBS | BODY MASS INDEX: 32.08 KG/M2 | OXYGEN SATURATION: 94 % | SYSTOLIC BLOOD PRESSURE: 127 MMHG | DIASTOLIC BLOOD PRESSURE: 73 MMHG | HEIGHT: 74 IN

## 2022-10-20 DIAGNOSIS — I10 ESSENTIAL HYPERTENSION: ICD-10-CM

## 2022-10-20 DIAGNOSIS — I25.10 ASCVD (ARTERIOSCLEROTIC CARDIOVASCULAR DISEASE): Primary | ICD-10-CM

## 2022-10-20 DIAGNOSIS — E78.5 DYSLIPIDEMIA: ICD-10-CM

## 2022-10-20 PROCEDURE — 99214 OFFICE O/P EST MOD 30 MIN: CPT | Performed by: NURSE PRACTITIONER

## 2022-10-20 NOTE — PROGRESS NOTES
Fish Delaney MD  Adalberto Llamas  1955  10/20/2022    Patient Active Problem List   Diagnosis   • Liver cirrhosis (HCC)   • Thrombocytopenia (HCC)   • B12 deficiency   • Degenerative disc disease, cervical   • Degenerative disc disease, lumbar   • Spinal stenosis in cervical region   • Chest pain   • Other chest pain   • ASCVD (arteriosclerotic cardiovascular disease), status post stenting of the LAD June 5, 2018, clinically stable.   • Dyslipidemia   • Essential hypertension   • GERD (gastroesophageal reflux disease)   • Dyspnea on exertion       Dear Fish Delaney MD:    Subjective     Chief Complaint   Patient presents with   • Follow-up     Stress echo   • Med Management     verbal           History of Present Illness:    Adalberto Llamas is a 67 y.o. male with a past medical history of ASCVD status post stenting of the LAD in June 2018, hypertension, and dyslipidemia.  He presents today for cardiology follow up.  Previously he was having some chest discomfort and exertional fatigue.  He was evaluated further with stress test and echocardiogram.  Lexiscan stress test revealed a small to medium size infarct in the inferior wall with no significant ischemia noted.  An echocardiogram revealed normal LV ejection fraction of 56 to 60% with normal LV wall motion and no significant valvular abnormalities.  The patient denies any recent chest pains.  Reports he has some chronic bilateral rib pain associated with shoulder and neck movement.  He has had diffuse arthralgias, thus PCP discontinued his statin.  However, he has not had any improvement in his symptoms since discontinuing the medication.  He reports he will be having repeat lipid panel in the next couple weeks, ordered by his PCP.      Allergies   Allergen Reactions   • Penicillins Unknown - High Severity   :      Current Outpatient Medications:   •  allopurinol (ZYLOPRIM) 300 MG tablet, Take 300 mg by mouth Every Night., Disp: , Rfl:   •  amLODIPine  "(NORVASC) 5 MG tablet, Take 1 tablet by mouth Daily., Disp: 30 tablet, Rfl: 11  •  aspirin 81 MG EC tablet, Take 1 tablet by mouth Daily., Disp: 30 tablet, Rfl: 5  •  finasteride (PROSCAR) 5 MG tablet, Take 5 mg by mouth Daily., Disp: , Rfl:   •  glimepiride (AMARYL) 2 MG tablet, Take 2 mg by mouth Every Night., Disp: , Rfl:   •  HYDROcodone-acetaminophen (NORCO)  MG per tablet, Take 1 tablet by mouth 3 (Three) Times a Day As Needed for Mild Pain ., Disp: , Rfl:   •  lisinopril-hydrochlorothiazide (PRINZIDE,ZESTORETIC) 20-12.5 MG per tablet, Take 1 tablet by mouth 2 (Two) Times a Day., Disp: 60 tablet, Rfl: 5  •  metoprolol succinate XL (TOPROL-XL) 50 MG 24 hr tablet, TAKE 1 TABLET BY MOUTH EVERY DAY, Disp: 30 tablet, Rfl: 3  •  tamsulosin (FLOMAX) 0.4 MG capsule 24 hr capsule, Take 1 capsule by mouth every night., Disp: , Rfl:       The following portions of the patient's history were reviewed and updated as appropriate: allergies, current medications, past family history, past medical history, past social history, past surgical history and problem list.    Social History     Tobacco Use   • Smoking status: Never   • Smokeless tobacco: Never   Substance Use Topics   • Alcohol use: Yes     Comment: Rare   • Drug use: No       Review of Systems   Constitutional: Negative for decreased appetite and malaise/fatigue.   Cardiovascular: Negative for chest pain, dyspnea on exertion and palpitations.   Respiratory: Negative for cough and shortness of breath.        Objective    Vitals:    10/20/22 1456   BP: 127/73   BP Location: Left arm   Patient Position: Sitting   Cuff Size: Adult   Pulse: 70   SpO2: 94%   Weight: 113 kg (250 lb)   Height: 188 cm (74\")     Body mass index is 32.1 kg/m².        Vitals reviewed.   Constitutional:       Appearance: Healthy appearance. Well-developed and not in distress.   HENT:      Head: Normocephalic and atraumatic.   Pulmonary:      Effort: Pulmonary effort is normal.      Breath " sounds: Normal breath sounds. No wheezing. No rales.   Cardiovascular:      Normal rate. Regular rhythm.      Murmurs: There is no murmur.      . No S3 and S4 gallop.   Edema:     Peripheral edema absent.   Abdominal:      General: Bowel sounds are normal.      Palpations: Abdomen is soft.   Skin:     General: Skin is warm and dry.   Neurological:      Mental Status: Alert, oriented to person, place, and time and oriented to person, place and time.   Psychiatric:         Mood and Affect: Mood normal.         Behavior: Behavior normal.         Lab Results   Component Value Date     12/04/2019    K 4.2 12/04/2019     12/04/2019    CO2 22.2 12/04/2019    BUN 16 12/04/2019    CREATININE 0.70 07/14/2022    GLUCOSE 285 (H) 12/04/2019    CALCIUM 10.5 12/04/2019    AST 23 12/04/2019    ALT 26 12/04/2019    ALKPHOS 70 12/04/2019    LABIL2 1.6 04/23/2016     Lab Results   Component Value Date    CKTOTAL 103 12/04/2019     Lab Results   Component Value Date    WBC 7.79 12/04/2019    HGB 15.8 12/04/2019    HCT 47.3 12/04/2019     (L) 12/04/2019     Lab Results   Component Value Date    INR 0.95 04/23/2016    INR 0.96 04/19/2016     Lab Results   Component Value Date    MG 2.2 06/06/2018     Lab Results   Component Value Date    TSH 0.744 06/05/2018    PSA 0.310 06/14/2018    CHLPL 100 05/05/2014    TRIG 432 (H) 06/05/2018    HDL 28 (L) 06/05/2018    LDL  06/05/2018      Comment:      Unable to calculate      No results found for: BNP        Procedures      Assessment & Plan    Diagnosis Plan   1. ASCVD (arteriosclerotic cardiovascular disease), status post stenting of the LAD June 5, 2018, clinically stable.        2. Essential hypertension        3. Dyslipidemia                     Recommendations:    1. ASCVD - I discussed the recent stress test and echocardiogram results with the patient today. he denies any recent anginal symptoms. Will continue low dose aspirin, lisinopril, and metoprolol.   2. Essential  hypertension - well controlled.  3. Dyslipidemia - will be having lipid panel soon. If LDL is above goal, will consider PCSK9 inhibitor therapy. The patient is agreeable.  4. Follow up in 3 months or sooner if needed.          Return in about 3 months (around 1/20/2023).    As always, I appreciate very much the opportunity to participate in the cardiovascular care of your patients.      With Best Regards,    CHRISTY Ortiz

## 2023-01-20 ENCOUNTER — OFFICE VISIT (OUTPATIENT)
Dept: CARDIOLOGY | Facility: CLINIC | Age: 68
End: 2023-01-20
Payer: MEDICARE

## 2023-01-20 VITALS
OXYGEN SATURATION: 94 % | HEIGHT: 74 IN | BODY MASS INDEX: 32.98 KG/M2 | HEART RATE: 82 BPM | WEIGHT: 257 LBS | SYSTOLIC BLOOD PRESSURE: 154 MMHG | DIASTOLIC BLOOD PRESSURE: 81 MMHG

## 2023-01-20 DIAGNOSIS — I25.10 ASCVD (ARTERIOSCLEROTIC CARDIOVASCULAR DISEASE): Primary | ICD-10-CM

## 2023-01-20 DIAGNOSIS — I10 ESSENTIAL HYPERTENSION: ICD-10-CM

## 2023-01-20 DIAGNOSIS — E78.5 DYSLIPIDEMIA: ICD-10-CM

## 2023-01-20 PROCEDURE — 99213 OFFICE O/P EST LOW 20 MIN: CPT | Performed by: NURSE PRACTITIONER

## 2023-01-20 RX ORDER — ATORVASTATIN CALCIUM 40 MG/1
1 TABLET, FILM COATED ORAL DAILY
COMMUNITY
Start: 2022-06-08

## 2023-01-20 NOTE — LETTER
January 20, 2023     Fish Delaney MD  [address] [facility] [place] [address]    Patient: Adalberto Llamas   YOB: 1955   Date of Visit: 1/20/2023       Dear Dr. Rhett MD:    Thank you for referring Adalberto Llamas to me for evaluation. Below are the relevant portions of my assessment and plan of care.    If you have questions, please do not hesitate to call me. I look forward to following Adalberto along with you.         Sincerely,        CHRISTY Ortiz        CC: No Recipients  Farzana House, CHRISTY  01/20/23 0939  Signed  Fish Delaney MD  Adalberto Llamas  1955 01/20/2023    Patient Active Problem List   Diagnosis   • Liver cirrhosis (HCC)   • Thrombocytopenia (HCC)   • B12 deficiency   • Degenerative disc disease, cervical   • Degenerative disc disease, lumbar   • Spinal stenosis in cervical region   • Chest pain   • Other chest pain   • ASCVD (arteriosclerotic cardiovascular disease), status post stenting of the LAD June 5, 2018, clinically stable.   • Dyslipidemia   • Essential hypertension   • GERD (gastroesophageal reflux disease)   • Dyspnea on exertion       Dear Fish Delaney MD:    Subjective      Chief Complaint   Patient presents with   • Med Management     Verbal.            History of Present Illness:    Adalberto Llamas is a 67 y.o. male with a past medical history of ASCVD status post stenting of the LAD in June 2018, hypertension, and dyslipidemia.  He presents today for cardiology follow up. From a cardiac standpoint reports he has been doing very well. He denies any chest pains, shortness of breath, or palpitations. He has had severe low back pain and will have an MRI soon. He states his BP runs higher when he is in pain.          Allergies   Allergen Reactions   • Penicillins Unknown - High Severity   :      Current Outpatient Medications:   •  allopurinol (ZYLOPRIM) 300 MG tablet, Take 300 mg by mouth Every Night., Disp: , Rfl:   •  amLODIPine (NORVASC) 5 MG  "tablet, Take 1 tablet by mouth Daily., Disp: 30 tablet, Rfl: 11  •  aspirin 81 MG EC tablet, Take 1 tablet by mouth Daily., Disp: 30 tablet, Rfl: 5  •  atorvastatin (LIPITOR) 40 MG tablet, Take 1 tablet by mouth Daily., Disp: , Rfl:   •  finasteride (PROSCAR) 5 MG tablet, Take 5 mg by mouth Daily., Disp: , Rfl:   •  glimepiride (AMARYL) 2 MG tablet, Take 2 mg by mouth Every Night., Disp: , Rfl:   •  HYDROcodone-acetaminophen (NORCO)  MG per tablet, Take 1 tablet by mouth 3 (Three) Times a Day As Needed for Mild Pain ., Disp: , Rfl:   •  lisinopril-hydrochlorothiazide (PRINZIDE,ZESTORETIC) 20-12.5 MG per tablet, Take 1 tablet by mouth 2 (Two) Times a Day., Disp: 60 tablet, Rfl: 5  •  metoprolol succinate XL (TOPROL-XL) 50 MG 24 hr tablet, TAKE 1 TABLET BY MOUTH EVERY DAY, Disp: 30 tablet, Rfl: 3  •  tamsulosin (FLOMAX) 0.4 MG capsule 24 hr capsule, Take 1 capsule by mouth every night., Disp: , Rfl:       The following portions of the patient's history were reviewed and updated as appropriate: allergies, current medications, past family history, past medical history, past social history, past surgical history and problem list.    Social History     Tobacco Use   • Smoking status: Never   • Smokeless tobacco: Never   Substance Use Topics   • Alcohol use: Yes     Comment: Rare   • Drug use: No       Review of Systems   Constitutional: Negative for decreased appetite and malaise/fatigue.   Cardiovascular: Negative for chest pain, dyspnea on exertion and palpitations.   Respiratory: Negative for cough and shortness of breath.    Musculoskeletal: Positive for back pain.       Objective    Vitals:    01/20/23 0903   BP: 154/81   BP Location: Right arm   Patient Position: Sitting   Cuff Size: Adult   Pulse: 82   SpO2: 94%   Weight: 117 kg (257 lb)   Height: 188 cm (74\")     Body mass index is 33 kg/m².        Vitals reviewed.   Constitutional:       Appearance: Healthy appearance. Well-developed and not in distress.    "   Comments: Ambulates with cane   HENT:      Head: Normocephalic and atraumatic.   Pulmonary:      Effort: Pulmonary effort is normal.      Breath sounds: Normal breath sounds. No wheezing. No rales.   Cardiovascular:      Normal rate. Regular rhythm.      Murmurs: There is no murmur.      . No S3 and S4 gallop.   Edema:     Peripheral edema absent.   Abdominal:      General: Bowel sounds are normal.      Palpations: Abdomen is soft.   Skin:     General: Skin is warm and dry.   Neurological:      Mental Status: Alert, oriented to person, place, and time and oriented to person, place and time.   Psychiatric:         Mood and Affect: Mood normal.         Behavior: Behavior normal.         Lab Results   Component Value Date     12/04/2019    K 4.2 12/04/2019     12/04/2019    CO2 22.2 12/04/2019    BUN 16 12/04/2019    CREATININE 0.70 07/14/2022    GLUCOSE 285 (H) 12/04/2019    CALCIUM 10.5 12/04/2019    AST 23 12/04/2019    ALT 26 12/04/2019    ALKPHOS 70 12/04/2019    LABIL2 1.6 04/23/2016     Lab Results   Component Value Date    CKTOTAL 103 12/04/2019     Lab Results   Component Value Date    WBC 7.79 12/04/2019    HGB 15.8 12/04/2019    HCT 47.3 12/04/2019     (L) 12/04/2019     Lab Results   Component Value Date    INR 0.95 04/23/2016    INR 0.96 04/19/2016     Lab Results   Component Value Date    MG 2.2 06/06/2018     Lab Results   Component Value Date    TSH 0.744 06/05/2018    PSA 0.310 06/14/2018    CHLPL 100 05/05/2014    TRIG 432 (H) 06/05/2018    HDL 28 (L) 06/05/2018    LDL  06/05/2018      Comment:      Unable to calculate      No results found for: BNP        Procedures      Assessment & Plan    Diagnosis Plan   1. ASCVD (arteriosclerotic cardiovascular disease), status post stenting of the LAD June 5, 2018, clinically stable.        2. Essential hypertension        3. Dyslipidemia                    Recommendations:    1. ASCVD-no recent anginal symptoms.  Recent stress test  negative for ischemia, EF normal on echocardiogram.  Continue low-dose aspirin, lisinopril, metoprolol, and atorvastatin.  2. Essential hypertension-elevated today.  However, reports this is secondary to pain.  He will continue to monitor.  3. Lipidemia-continue statin therapy.  4. Follow-up in 8 months or sooner if needed.        Return in about 6 months (around 7/20/2023) for Recheck.    As always, I appreciate very much the opportunity to participate in the cardiovascular care of your patients.      With Best Regards,    CHRISTY Ortiz

## 2023-01-20 NOTE — PROGRESS NOTES
Fish Delaney MD  Adalberto Llamas  1955 01/20/2023    Patient Active Problem List   Diagnosis   • Liver cirrhosis (HCC)   • Thrombocytopenia (HCC)   • B12 deficiency   • Degenerative disc disease, cervical   • Degenerative disc disease, lumbar   • Spinal stenosis in cervical region   • Chest pain   • Other chest pain   • ASCVD (arteriosclerotic cardiovascular disease), status post stenting of the LAD June 5, 2018, clinically stable.   • Dyslipidemia   • Essential hypertension   • GERD (gastroesophageal reflux disease)   • Dyspnea on exertion       Dear Fish Delaney MD:    Subjective     Chief Complaint   Patient presents with   • Med Management     Verbal.            History of Present Illness:    Adalberto Llamas is a 67 y.o. male with a past medical history of ASCVD status post stenting of the LAD in June 2018, hypertension, and dyslipidemia.  He presents today for cardiology follow up. From a cardiac standpoint reports he has been doing very well. He denies any chest pains, shortness of breath, or palpitations. He has had severe low back pain and will have an MRI soon. He states his BP runs higher when he is in pain.          Allergies   Allergen Reactions   • Penicillins Unknown - High Severity   :      Current Outpatient Medications:   •  allopurinol (ZYLOPRIM) 300 MG tablet, Take 300 mg by mouth Every Night., Disp: , Rfl:   •  amLODIPine (NORVASC) 5 MG tablet, Take 1 tablet by mouth Daily., Disp: 30 tablet, Rfl: 11  •  aspirin 81 MG EC tablet, Take 1 tablet by mouth Daily., Disp: 30 tablet, Rfl: 5  •  atorvastatin (LIPITOR) 40 MG tablet, Take 1 tablet by mouth Daily., Disp: , Rfl:   •  finasteride (PROSCAR) 5 MG tablet, Take 5 mg by mouth Daily., Disp: , Rfl:   •  glimepiride (AMARYL) 2 MG tablet, Take 2 mg by mouth Every Night., Disp: , Rfl:   •  HYDROcodone-acetaminophen (NORCO)  MG per tablet, Take 1 tablet by mouth 3 (Three) Times a Day As Needed for Mild Pain ., Disp: , Rfl:   •   "lisinopril-hydrochlorothiazide (PRINZIDE,ZESTORETIC) 20-12.5 MG per tablet, Take 1 tablet by mouth 2 (Two) Times a Day., Disp: 60 tablet, Rfl: 5  •  metoprolol succinate XL (TOPROL-XL) 50 MG 24 hr tablet, TAKE 1 TABLET BY MOUTH EVERY DAY, Disp: 30 tablet, Rfl: 3  •  tamsulosin (FLOMAX) 0.4 MG capsule 24 hr capsule, Take 1 capsule by mouth every night., Disp: , Rfl:       The following portions of the patient's history were reviewed and updated as appropriate: allergies, current medications, past family history, past medical history, past social history, past surgical history and problem list.    Social History     Tobacco Use   • Smoking status: Never   • Smokeless tobacco: Never   Substance Use Topics   • Alcohol use: Yes     Comment: Rare   • Drug use: No       Review of Systems   Constitutional: Negative for decreased appetite and malaise/fatigue.   Cardiovascular: Negative for chest pain, dyspnea on exertion and palpitations.   Respiratory: Negative for cough and shortness of breath.    Musculoskeletal: Positive for back pain.       Objective   Vitals:    01/20/23 0903   BP: 154/81   BP Location: Right arm   Patient Position: Sitting   Cuff Size: Adult   Pulse: 82   SpO2: 94%   Weight: 117 kg (257 lb)   Height: 188 cm (74\")     Body mass index is 33 kg/m².        Vitals reviewed.   Constitutional:       Appearance: Healthy appearance. Well-developed and not in distress.      Comments: Ambulates with cane   HENT:      Head: Normocephalic and atraumatic.   Pulmonary:      Effort: Pulmonary effort is normal.      Breath sounds: Normal breath sounds. No wheezing. No rales.   Cardiovascular:      Normal rate. Regular rhythm.      Murmurs: There is no murmur.      . No S3 and S4 gallop.   Edema:     Peripheral edema absent.   Abdominal:      General: Bowel sounds are normal.      Palpations: Abdomen is soft.   Skin:     General: Skin is warm and dry.   Neurological:      Mental Status: Alert, oriented to person, " place, and time and oriented to person, place and time.   Psychiatric:         Mood and Affect: Mood normal.         Behavior: Behavior normal.         Lab Results   Component Value Date     12/04/2019    K 4.2 12/04/2019     12/04/2019    CO2 22.2 12/04/2019    BUN 16 12/04/2019    CREATININE 0.70 07/14/2022    GLUCOSE 285 (H) 12/04/2019    CALCIUM 10.5 12/04/2019    AST 23 12/04/2019    ALT 26 12/04/2019    ALKPHOS 70 12/04/2019    LABIL2 1.6 04/23/2016     Lab Results   Component Value Date    CKTOTAL 103 12/04/2019     Lab Results   Component Value Date    WBC 7.79 12/04/2019    HGB 15.8 12/04/2019    HCT 47.3 12/04/2019     (L) 12/04/2019     Lab Results   Component Value Date    INR 0.95 04/23/2016    INR 0.96 04/19/2016     Lab Results   Component Value Date    MG 2.2 06/06/2018     Lab Results   Component Value Date    TSH 0.744 06/05/2018    PSA 0.310 06/14/2018    CHLPL 100 05/05/2014    TRIG 432 (H) 06/05/2018    HDL 28 (L) 06/05/2018    LDL  06/05/2018      Comment:      Unable to calculate      No results found for: BNP        Procedures      Assessment & Plan    Diagnosis Plan   1. ASCVD (arteriosclerotic cardiovascular disease), status post stenting of the LAD June 5, 2018, clinically stable.        2. Essential hypertension        3. Dyslipidemia                     Recommendations:    1. ASCVD-no recent anginal symptoms.  Recent stress test negative for ischemia, EF normal on echocardiogram.  Continue low-dose aspirin, lisinopril, metoprolol, and atorvastatin.  2. Essential hypertension-elevated today.  However, reports this is secondary to pain.  He will continue to monitor.  3. Lipidemia-continue statin therapy.  4. Follow-up in 8 months or sooner if needed.        Return in about 6 months (around 7/20/2023) for Recheck.    As always, I appreciate very much the opportunity to participate in the cardiovascular care of your patients.      With Best Regards,    Farzana House,  APRN

## 2024-02-01 ENCOUNTER — OFFICE VISIT (OUTPATIENT)
Dept: CARDIOLOGY | Facility: CLINIC | Age: 69
End: 2024-02-01
Payer: MEDICARE

## 2024-02-01 VITALS
DIASTOLIC BLOOD PRESSURE: 72 MMHG | HEART RATE: 69 BPM | SYSTOLIC BLOOD PRESSURE: 138 MMHG | BODY MASS INDEX: 33.62 KG/M2 | HEIGHT: 74 IN | WEIGHT: 262 LBS | OXYGEN SATURATION: 95 %

## 2024-02-01 DIAGNOSIS — E78.5 DYSLIPIDEMIA: ICD-10-CM

## 2024-02-01 DIAGNOSIS — Z01.810 PREOP CARDIOVASCULAR EXAM: ICD-10-CM

## 2024-02-01 DIAGNOSIS — I25.10 ASCVD (ARTERIOSCLEROTIC CARDIOVASCULAR DISEASE): Primary | ICD-10-CM

## 2024-02-01 DIAGNOSIS — I10 ESSENTIAL HYPERTENSION: ICD-10-CM

## 2024-02-01 RX ORDER — OMEPRAZOLE 40 MG/1
40 CAPSULE, DELAYED RELEASE ORAL DAILY
COMMUNITY

## 2024-02-01 RX ORDER — CYCLOBENZAPRINE HCL 10 MG
10 TABLET ORAL 3 TIMES DAILY PRN
COMMUNITY

## 2024-02-01 RX ORDER — AMLODIPINE BESYLATE 5 MG/1
5 TABLET ORAL DAILY
COMMUNITY

## 2024-02-01 RX ORDER — IBUPROFEN 600 MG/1
600 TABLET ORAL EVERY 6 HOURS PRN
COMMUNITY

## 2024-02-01 RX ORDER — MELOXICAM 15 MG/1
15 TABLET ORAL DAILY
COMMUNITY

## 2024-02-01 RX ORDER — SENNOSIDES A AND B 8.6 MG/1
1 TABLET, FILM COATED ORAL DAILY
COMMUNITY

## 2024-02-01 RX ORDER — ICOSAPENT ETHYL 1000 MG/1
2 CAPSULE ORAL 2 TIMES DAILY WITH MEALS
COMMUNITY

## 2024-02-01 RX ORDER — ROSUVASTATIN CALCIUM 20 MG/1
20 TABLET, COATED ORAL DAILY
COMMUNITY

## 2024-02-01 RX ORDER — ORAL SEMAGLUTIDE 14 MG/1
TABLET ORAL
COMMUNITY

## 2024-02-01 RX ORDER — ONDANSETRON 4 MG/1
4 TABLET, FILM COATED ORAL EVERY 8 HOURS PRN
COMMUNITY

## 2024-02-01 RX ORDER — DULOXETIN HYDROCHLORIDE 30 MG/1
30 CAPSULE, DELAYED RELEASE ORAL DAILY
COMMUNITY

## 2024-02-01 RX ORDER — HYDROXYZINE HYDROCHLORIDE 10 MG/1
10 TABLET, FILM COATED ORAL 3 TIMES DAILY PRN
COMMUNITY

## 2024-02-01 NOTE — PROGRESS NOTES
Fish Delaney MD  Adalberto Llamas  1955 02/01/2024    Patient Active Problem List   Diagnosis    Liver cirrhosis    Thrombocytopenia    B12 deficiency    Degenerative disc disease, cervical    Degenerative disc disease, lumbar    Spinal stenosis in cervical region    Chest pain    Other chest pain    ASCVD (arteriosclerotic cardiovascular disease), status post stenting of the LAD June 5, 2018, clinically stable.    Dyslipidemia    Essential hypertension    GERD (gastroesophageal reflux disease)    Dyspnea on exertion       Dear Fish Delaney MD:    Subjective     Chief Complaint   Patient presents with    Med Management    Surgical Clearance     Back surgery done by Dr. Reed.            History of Present Illness:    Adalberto Llamas is a 68 y.o. male with a past medical history of ASCVD status post stenting of the diagonal branch of LAD and 2018, hypertension, and dyslipidemia.  He presents today for cardiology follow-up.  Reports he has been doing very well from a cardiac standpoint.  Denies any chest pain, shortness of breath, or palpitations.  BP has been well-controlled.  He is requesting preoperative cardiac evaluation for upcoming lumbar spine surgery.      Echo 2022    Normal left ventricular cavity size and wall thickness noted. All left ventricular wall segments contract normally.    Left ventricular ejection fraction appears to be 56 - 60%.    The aortic valve is abnormal in structure. The aortic valve exhibits sclerosis. There is calcification of the aortic valve. The aortic valve was poorly visualized but appears trileaflet. Mild aortic valve regurgitation is present. Mild aortic valve stenosis is present.    Mild mitral annular calcification is present. Mild mitral valve regurgitation is present. No significant mitral valve stenosis is present.    There is no evidence of pericardial effusion    Stress test 2022     A pharmacological stress test was performed using regadenoson without  low-level exercise.    Findings consistent with an indeterminate ECG stress test.    Myocardial perfusion imaging indicates a small-to-medium-sized infarct located in the inferior wall with no significant ischemia noted.    Abnormal LV wall motion consistent with mild hypokinesis of the inferior wall.    Left ventricular ejection fraction is normal.  (Calculated EF = 52%).    Impressions are consistent with an intermediate risk study.  Allergies   Allergen Reactions    Penicillins Unknown - High Severity   :      Current Outpatient Medications:     amLODIPine (NORVASC) 5 MG tablet, Take 1 tablet by mouth Daily., Disp: , Rfl:     aspirin 81 MG EC tablet, Take 1 tablet by mouth Daily., Disp: 30 tablet, Rfl: 5    cyclobenzaprine (FLEXERIL) 10 MG tablet, Take 1 tablet by mouth 3 (Three) Times a Day As Needed for Muscle Spasms., Disp: , Rfl:     DULoxetine (CYMBALTA) 30 MG capsule, Take 1 capsule by mouth Daily., Disp: , Rfl:     finasteride (PROSCAR) 5 MG tablet, Take 1 tablet by mouth Daily., Disp: , Rfl:     glimepiride (AMARYL) 2 MG tablet, Take 1 tablet by mouth Every Night., Disp: , Rfl:     HYDROcodone-acetaminophen (NORCO)  MG per tablet, Take 1 tablet by mouth 3 (Three) Times a Day As Needed for Mild Pain., Disp: , Rfl:     hydrOXYzine (ATARAX) 10 MG tablet, Take 1 tablet by mouth 3 (Three) Times a Day As Needed for Itching., Disp: , Rfl:     ibuprofen (ADVIL,MOTRIN) 600 MG tablet, Take 1 tablet by mouth Every 6 (Six) Hours As Needed for Mild Pain., Disp: , Rfl:     icosapent ethyl (Vascepa) 1 g capsule capsule, Take 2 g by mouth 2 (Two) Times a Day With Meals., Disp: , Rfl:     lisinopril-hydrochlorothiazide (PRINZIDE,ZESTORETIC) 20-12.5 MG per tablet, Take 1 tablet by mouth 2 (Two) Times a Day., Disp: 60 tablet, Rfl: 5    meloxicam (MOBIC) 15 MG tablet, Take 1 tablet by mouth Daily., Disp: , Rfl:     metFORMIN (GLUCOPHAGE) 1000 MG tablet, Take 1 tablet by mouth 2 (Two) Times a Day With Meals., Disp: ,  "Rfl:     omeprazole (priLOSEC) 40 MG capsule, Take 1 capsule by mouth Daily., Disp: , Rfl:     ondansetron (ZOFRAN) 4 MG tablet, Take 1 tablet by mouth Every 8 (Eight) Hours As Needed for Nausea or Vomiting., Disp: , Rfl:     rosuvastatin (CRESTOR) 20 MG tablet, Take 1 tablet by mouth Daily., Disp: , Rfl:     Semaglutide (Rybelsus) 14 MG tablet, Take  by mouth., Disp: , Rfl:     senna 8.6 MG tablet, Take 1 tablet by mouth Daily., Disp: , Rfl:     tamsulosin (FLOMAX) 0.4 MG capsule 24 hr capsule, Take 1 capsule by mouth Every Night., Disp: , Rfl:       The following portions of the patient's history were reviewed and updated as appropriate: allergies, current medications, past family history, past medical history, past social history, past surgical history and problem list.    Social History     Tobacco Use    Smoking status: Never    Smokeless tobacco: Never   Substance Use Topics    Alcohol use: Yes     Comment: Rare    Drug use: No       Review of Systems   Constitutional: Negative for decreased appetite and malaise/fatigue.   Cardiovascular:  Negative for chest pain, dyspnea on exertion and palpitations.   Respiratory:  Negative for cough and shortness of breath.        Objective   Vitals:    02/01/24 1420   BP: 138/72   BP Location: Right arm   Patient Position: Sitting   Cuff Size: Adult   Pulse: 69   SpO2: 95%   Weight: 119 kg (262 lb)   Height: 188 cm (74\")     Body mass index is 33.64 kg/m².        Vitals reviewed.   Constitutional:       Appearance: Healthy appearance. Well-developed and not in distress.   HENT:      Head: Normocephalic and atraumatic.   Pulmonary:      Effort: Pulmonary effort is normal.      Breath sounds: Normal breath sounds. No wheezing. No rales.   Cardiovascular:      Normal rate. Regular rhythm.      Murmurs: There is no murmur.      . No S3 and S4 gallop.   Edema:     Peripheral edema absent.   Abdominal:      General: Bowel sounds are normal.      Palpations: Abdomen is soft. " "  Skin:     General: Skin is warm and dry.   Neurological:      Mental Status: Alert, oriented to person, place, and time and oriented to person, place and time.   Psychiatric:         Mood and Affect: Mood normal.         Behavior: Behavior normal.         Lab Results   Component Value Date     12/04/2019    K 4.2 12/04/2019     12/04/2019    CO2 22.2 12/04/2019    BUN 16 12/04/2019    CREATININE 0.70 07/14/2022    GLUCOSE 285 (H) 12/04/2019    CALCIUM 10.5 12/04/2019    AST 23 12/04/2019    ALT 26 12/04/2019    ALKPHOS 70 12/04/2019    LABIL2 1.6 04/23/2016     Lab Results   Component Value Date    CKTOTAL 103 12/04/2019     Lab Results   Component Value Date    WBC 7.79 12/04/2019    HGB 15.8 12/04/2019    HCT 47.3 12/04/2019     (L) 12/04/2019     Lab Results   Component Value Date    INR 0.98 02/15/2023    INR 0.95 04/23/2016    INR 0.96 04/19/2016     Lab Results   Component Value Date    MG 2.2 06/06/2018     Lab Results   Component Value Date    TSH 0.744 06/05/2018    PSA 0.310 06/14/2018    CHLPL 100 05/05/2014    TRIG 432 (H) 06/05/2018    HDL 28 (L) 06/05/2018    LDL  06/05/2018      Comment:      Unable to calculate      No results found for: \"BNP\"        Procedures      Assessment & Plan    Diagnosis Plan   1. ASCVD (arteriosclerotic cardiovascular disease)        2. Essential hypertension        3. Dyslipidemia        4. Preop cardiovascular exam                     Recommendations:    ASCVD-no recent anginal symptoms.  Continue low-dose aspirin, statin, and lisinopril.  Essential hypertension-BP well-controlled.  Continue amlodipine.  Dyslipidemia-on statin therapy.  Preoperative cardiac risk assessment-I have discussed this plan of care with Dr. Davis.  He should be a low to moderate cardiac risk for the upcoming lumbar spine surgery.  He may hold his aspirin 5 days prior to surgery and resume as soon as possible whenever considered safe by his surgeon after the " surgery.  Follow-up 2 months or sooner if needed.        Return in about 2 months (around 4/1/2024) for Recheck.    As always, I appreciate very much the opportunity to participate in the cardiovascular care of your patients.      With Best Regards,    CHRISTY Ortiz

## 2024-02-23 ENCOUNTER — APPOINTMENT (OUTPATIENT)
Dept: CT IMAGING | Facility: HOSPITAL | Age: 69
End: 2024-02-23
Payer: MEDICARE

## 2024-02-23 ENCOUNTER — APPOINTMENT (OUTPATIENT)
Dept: GENERAL RADIOLOGY | Facility: HOSPITAL | Age: 69
End: 2024-02-23
Payer: MEDICARE

## 2024-02-23 ENCOUNTER — HOSPITAL ENCOUNTER (EMERGENCY)
Facility: HOSPITAL | Age: 69
Discharge: OTHER INSTITUTION | End: 2024-02-23
Attending: STUDENT IN AN ORGANIZED HEALTH CARE EDUCATION/TRAINING PROGRAM
Payer: MEDICARE

## 2024-02-23 VITALS
BODY MASS INDEX: 32.08 KG/M2 | SYSTOLIC BLOOD PRESSURE: 105 MMHG | DIASTOLIC BLOOD PRESSURE: 85 MMHG | TEMPERATURE: 97.1 F | OXYGEN SATURATION: 97 % | RESPIRATION RATE: 16 BRPM | HEIGHT: 74 IN | WEIGHT: 250 LBS | HEART RATE: 94 BPM

## 2024-02-23 DIAGNOSIS — R41.82 ALTERED MENTAL STATUS, UNSPECIFIED ALTERED MENTAL STATUS TYPE: Primary | ICD-10-CM

## 2024-02-23 DIAGNOSIS — G89.18 POST-OP PAIN: ICD-10-CM

## 2024-02-23 DIAGNOSIS — D64.9 ANEMIA, UNSPECIFIED TYPE: ICD-10-CM

## 2024-02-23 LAB
ALBUMIN SERPL-MCNC: 3.2 G/DL (ref 3.5–5.2)
ALBUMIN/GLOB SERPL: 1.3 G/DL
ALP SERPL-CCNC: 139 U/L (ref 39–117)
ALT SERPL W P-5'-P-CCNC: 60 U/L (ref 1–41)
ANION GAP SERPL CALCULATED.3IONS-SCNC: 10.7 MMOL/L (ref 5–15)
APTT PPP: 28.8 SECONDS (ref 26.5–34.5)
AST SERPL-CCNC: 41 U/L (ref 1–40)
BASOPHILS # BLD AUTO: 0.05 10*3/MM3 (ref 0–0.2)
BASOPHILS NFR BLD AUTO: 0.3 % (ref 0–1.5)
BILIRUB SERPL-MCNC: 0.4 MG/DL (ref 0–1.2)
BILIRUB UR QL STRIP: NEGATIVE
BUN SERPL-MCNC: 28 MG/DL (ref 8–23)
BUN/CREAT SERPL: 35.4 (ref 7–25)
CALCIUM SPEC-SCNC: 9.6 MG/DL (ref 8.6–10.5)
CHLORIDE SERPL-SCNC: 101 MMOL/L (ref 98–107)
CLARITY UR: CLEAR
CO2 SERPL-SCNC: 24.3 MMOL/L (ref 22–29)
COLOR UR: YELLOW
CREAT SERPL-MCNC: 0.79 MG/DL (ref 0.76–1.27)
CRP SERPL-MCNC: 1.33 MG/DL (ref 0–0.5)
D-LACTATE SERPL-SCNC: 3 MMOL/L (ref 0.5–2)
D-LACTATE SERPL-SCNC: 3.3 MMOL/L (ref 0.5–2)
DEPRECATED RDW RBC AUTO: 52.7 FL (ref 37–54)
EGFRCR SERPLBLD CKD-EPI 2021: 95.6 ML/MIN/1.73
EOSINOPHIL # BLD AUTO: 0.03 10*3/MM3 (ref 0–0.4)
EOSINOPHIL NFR BLD AUTO: 0.2 % (ref 0.3–6.2)
ERYTHROCYTE [DISTWIDTH] IN BLOOD BY AUTOMATED COUNT: 14.3 % (ref 12.3–15.4)
ERYTHROCYTE [SEDIMENTATION RATE] IN BLOOD: 23 MM/HR (ref 0–20)
GEN 5 2HR TROPONIN T REFLEX: 28 NG/L
GLOBULIN UR ELPH-MCNC: 2.4 GM/DL
GLUCOSE SERPL-MCNC: 338 MG/DL (ref 65–99)
GLUCOSE UR STRIP-MCNC: ABNORMAL MG/DL
HCT VFR BLD AUTO: 25.9 % (ref 37.5–51)
HCT VFR BLD AUTO: 26.3 % (ref 37.5–51)
HGB BLD-MCNC: 8.4 G/DL (ref 13–17.7)
HGB BLD-MCNC: 8.4 G/DL (ref 13–17.7)
HGB UR QL STRIP.AUTO: NEGATIVE
HOLD SPECIMEN: NORMAL
HOLD SPECIMEN: NORMAL
IMM GRANULOCYTES # BLD AUTO: 0.12 10*3/MM3 (ref 0–0.05)
IMM GRANULOCYTES NFR BLD AUTO: 0.8 % (ref 0–0.5)
INR PPP: 1.14 (ref 0.9–1.1)
KETONES UR QL STRIP: NEGATIVE
LEUKOCYTE ESTERASE UR QL STRIP.AUTO: NEGATIVE
LYMPHOCYTES # BLD AUTO: 1.84 10*3/MM3 (ref 0.7–3.1)
LYMPHOCYTES NFR BLD AUTO: 11.9 % (ref 19.6–45.3)
MCH RBC QN AUTO: 32.8 PG (ref 26.6–33)
MCHC RBC AUTO-ENTMCNC: 32.4 G/DL (ref 31.5–35.7)
MCV RBC AUTO: 101.2 FL (ref 79–97)
MONOCYTES # BLD AUTO: 1.42 10*3/MM3 (ref 0.1–0.9)
MONOCYTES NFR BLD AUTO: 9.2 % (ref 5–12)
NEUTROPHILS NFR BLD AUTO: 12.04 10*3/MM3 (ref 1.7–7)
NEUTROPHILS NFR BLD AUTO: 77.6 % (ref 42.7–76)
NITRITE UR QL STRIP: NEGATIVE
NRBC BLD AUTO-RTO: 0 /100 WBC (ref 0–0.2)
PH UR STRIP.AUTO: 5.5 [PH] (ref 5–8)
PLATELET # BLD AUTO: 418 10*3/MM3 (ref 140–450)
PMV BLD AUTO: 9.5 FL (ref 6–12)
POTASSIUM SERPL-SCNC: 4.8 MMOL/L (ref 3.5–5.2)
PROT SERPL-MCNC: 5.6 G/DL (ref 6–8.5)
PROT UR QL STRIP: NEGATIVE
PROTHROMBIN TIME: 15.2 SECONDS (ref 12.1–14.7)
RBC # BLD AUTO: 2.56 10*6/MM3 (ref 4.14–5.8)
SODIUM SERPL-SCNC: 136 MMOL/L (ref 136–145)
SP GR UR STRIP: 1.03 (ref 1–1.03)
TROPONIN T DELTA: -5 NG/L
TROPONIN T SERPL HS-MCNC: 33 NG/L
UROBILINOGEN UR QL STRIP: ABNORMAL
WBC NRBC COR # BLD AUTO: 15.5 10*3/MM3 (ref 3.4–10.8)
WHOLE BLOOD HOLD COAG: NORMAL
WHOLE BLOOD HOLD SPECIMEN: NORMAL

## 2024-02-23 PROCEDURE — 25010000002 CEFTRIAXONE PER 250 MG: Performed by: PHYSICIAN ASSISTANT

## 2024-02-23 PROCEDURE — 36415 COLL VENOUS BLD VENIPUNCTURE: CPT

## 2024-02-23 PROCEDURE — 93005 ELECTROCARDIOGRAM TRACING: CPT | Performed by: PHYSICIAN ASSISTANT

## 2024-02-23 PROCEDURE — 81003 URINALYSIS AUTO W/O SCOPE: CPT | Performed by: STUDENT IN AN ORGANIZED HEALTH CARE EDUCATION/TRAINING PROGRAM

## 2024-02-23 PROCEDURE — 71045 X-RAY EXAM CHEST 1 VIEW: CPT | Performed by: RADIOLOGY

## 2024-02-23 PROCEDURE — 72131 CT LUMBAR SPINE W/O DYE: CPT

## 2024-02-23 PROCEDURE — 87040 BLOOD CULTURE FOR BACTERIA: CPT | Performed by: STUDENT IN AN ORGANIZED HEALTH CARE EDUCATION/TRAINING PROGRAM

## 2024-02-23 PROCEDURE — 85730 THROMBOPLASTIN TIME PARTIAL: CPT | Performed by: STUDENT IN AN ORGANIZED HEALTH CARE EDUCATION/TRAINING PROGRAM

## 2024-02-23 PROCEDURE — 72131 CT LUMBAR SPINE W/O DYE: CPT | Performed by: RADIOLOGY

## 2024-02-23 PROCEDURE — 84484 ASSAY OF TROPONIN QUANT: CPT | Performed by: PHYSICIAN ASSISTANT

## 2024-02-23 PROCEDURE — 83605 ASSAY OF LACTIC ACID: CPT | Performed by: STUDENT IN AN ORGANIZED HEALTH CARE EDUCATION/TRAINING PROGRAM

## 2024-02-23 PROCEDURE — 99285 EMERGENCY DEPT VISIT HI MDM: CPT

## 2024-02-23 PROCEDURE — 85014 HEMATOCRIT: CPT | Performed by: STUDENT IN AN ORGANIZED HEALTH CARE EDUCATION/TRAINING PROGRAM

## 2024-02-23 PROCEDURE — 96376 TX/PRO/DX INJ SAME DRUG ADON: CPT

## 2024-02-23 PROCEDURE — 72128 CT CHEST SPINE W/O DYE: CPT

## 2024-02-23 PROCEDURE — 85652 RBC SED RATE AUTOMATED: CPT | Performed by: PHYSICIAN ASSISTANT

## 2024-02-23 PROCEDURE — 85025 COMPLETE CBC W/AUTO DIFF WBC: CPT | Performed by: PHYSICIAN ASSISTANT

## 2024-02-23 PROCEDURE — 72128 CT CHEST SPINE W/O DYE: CPT | Performed by: RADIOLOGY

## 2024-02-23 PROCEDURE — 70450 CT HEAD/BRAIN W/O DYE: CPT | Performed by: RADIOLOGY

## 2024-02-23 PROCEDURE — 96367 TX/PROPH/DG ADDL SEQ IV INF: CPT

## 2024-02-23 PROCEDURE — 25510000001 IOPAMIDOL 61 % SOLUTION: Performed by: STUDENT IN AN ORGANIZED HEALTH CARE EDUCATION/TRAINING PROGRAM

## 2024-02-23 PROCEDURE — 85018 HEMOGLOBIN: CPT | Performed by: STUDENT IN AN ORGANIZED HEALTH CARE EDUCATION/TRAINING PROGRAM

## 2024-02-23 PROCEDURE — 80053 COMPREHEN METABOLIC PANEL: CPT | Performed by: PHYSICIAN ASSISTANT

## 2024-02-23 PROCEDURE — 74177 CT ABD & PELVIS W/CONTRAST: CPT

## 2024-02-23 PROCEDURE — 86140 C-REACTIVE PROTEIN: CPT | Performed by: PHYSICIAN ASSISTANT

## 2024-02-23 PROCEDURE — 71045 X-RAY EXAM CHEST 1 VIEW: CPT

## 2024-02-23 PROCEDURE — 93010 ELECTROCARDIOGRAM REPORT: CPT | Performed by: INTERNAL MEDICINE

## 2024-02-23 PROCEDURE — 74177 CT ABD & PELVIS W/CONTRAST: CPT | Performed by: RADIOLOGY

## 2024-02-23 PROCEDURE — 25810000003 SODIUM CHLORIDE 0.9 % SOLUTION: Performed by: PHYSICIAN ASSISTANT

## 2024-02-23 PROCEDURE — 85610 PROTHROMBIN TIME: CPT | Performed by: STUDENT IN AN ORGANIZED HEALTH CARE EDUCATION/TRAINING PROGRAM

## 2024-02-23 PROCEDURE — 25010000002 VANCOMYCIN 5 G RECONSTITUTED SOLUTION: Performed by: PHYSICIAN ASSISTANT

## 2024-02-23 PROCEDURE — 96365 THER/PROPH/DIAG IV INF INIT: CPT

## 2024-02-23 PROCEDURE — 70450 CT HEAD/BRAIN W/O DYE: CPT

## 2024-02-23 RX ORDER — PANTOPRAZOLE SODIUM 40 MG/10ML
80 INJECTION, POWDER, LYOPHILIZED, FOR SOLUTION INTRAVENOUS ONCE
Status: COMPLETED | OUTPATIENT
Start: 2024-02-23 | End: 2024-02-23

## 2024-02-23 RX ORDER — NALOXONE HYDROCHLORIDE 1 MG/ML
2 INJECTION INTRAMUSCULAR; INTRAVENOUS; SUBCUTANEOUS ONCE
Status: DISCONTINUED | OUTPATIENT
Start: 2024-02-23 | End: 2024-02-23

## 2024-02-23 RX ORDER — VANCOMYCIN/0.9 % SOD CHLORIDE 1.5G/250ML
1500 PLASTIC BAG, INJECTION (ML) INTRAVENOUS ONCE
Status: COMPLETED | OUTPATIENT
Start: 2024-02-23 | End: 2024-02-23

## 2024-02-23 RX ORDER — SODIUM CHLORIDE 0.9 % (FLUSH) 0.9 %
10 SYRINGE (ML) INJECTION AS NEEDED
Status: DISCONTINUED | OUTPATIENT
Start: 2024-02-23 | End: 2024-02-23 | Stop reason: HOSPADM

## 2024-02-23 RX ADMIN — SODIUM CHLORIDE 1000 ML: 9 INJECTION, SOLUTION INTRAVENOUS at 10:20

## 2024-02-23 RX ADMIN — PANTOPRAZOLE SODIUM 8 MG/HR: 40 INJECTION, POWDER, FOR SOLUTION INTRAVENOUS at 16:01

## 2024-02-23 RX ADMIN — VANCOMYCIN HYDROCHLORIDE 1500 MG: 5 INJECTION, POWDER, LYOPHILIZED, FOR SOLUTION INTRAVENOUS at 13:29

## 2024-02-23 RX ADMIN — IOPAMIDOL 70 ML: 612 INJECTION, SOLUTION INTRAVENOUS at 12:39

## 2024-02-23 RX ADMIN — SODIUM CHLORIDE 2000 MG: 9 INJECTION, SOLUTION INTRAVENOUS at 12:23

## 2024-02-23 RX ADMIN — PANTOPRAZOLE SODIUM 80 MG: 40 INJECTION, POWDER, FOR SOLUTION INTRAVENOUS at 16:00

## 2024-02-23 NOTE — ED PROVIDER NOTES
Subjective   History of Present Illness  68-year-old male who presents to the emergency department chief complaint dizziness.  Patient states has had lightheadedness with near syncope.  Patient has had spinal surgery 20 days ago.  This was done at [facility] by Dr. Reed.  Patient has had intermittent confusion.  Has had generalized weakness.  Wife states that he has not had any bloody stools.  States that she has help assist him to the bathroom.    History provided by:  Patient   used: No    Dizziness  Quality:  Lightheadedness  Severity:  Moderate  Onset quality:  Gradual  Duration:  2 days  Timing:  Intermittent  Progression:  Worsening  Chronicity:  New  Context: bowel movement    Context: not when bending over, not with inactivity, not with loss of consciousness, not with physical activity and not when standing up    Relieved by:  Nothing  Worsened by:  Nothing  Ineffective treatments:  None tried  Associated symptoms: nausea and weakness    Associated symptoms: no blood in stool, no chest pain, no diarrhea, no headaches, no hearing loss, no shortness of breath, no vision changes and no vomiting    Risk factors: no anemia, no heart disease, no hx of stroke, no hx of vertigo and no multiple medications        Review of Systems   Constitutional:  Negative for activity change, chills, diaphoresis and fever.   HENT: Negative.  Negative for dental problem, drooling, ear discharge, ear pain and hearing loss.    Eyes: Negative.  Negative for pain, redness and itching.   Respiratory: Negative.  Negative for chest tightness and shortness of breath.    Cardiovascular: Negative.  Negative for chest pain and leg swelling.   Gastrointestinal:  Positive for nausea. Negative for abdominal distention, abdominal pain, anal bleeding, blood in stool, constipation, diarrhea and vomiting.   Endocrine: Negative.  Negative for heat intolerance, polydipsia and polyphagia.   Genitourinary: Negative.   Negative for frequency, penile discharge and penile pain.   Musculoskeletal: Negative.  Negative for back pain, gait problem and myalgias.   Skin: Negative.  Negative for color change and wound.   Neurological:  Positive for dizziness and weakness. Negative for tremors, seizures, numbness and headaches.   Hematological: Negative.  Negative for adenopathy.   Psychiatric/Behavioral: Negative.  Negative for behavioral problems, decreased concentration and dysphoric mood. The patient is not nervous/anxious.    All other systems reviewed and are negative.      Past Medical History:   Diagnosis Date    Arthritis     B12 deficiency     Diabetes mellitus     Gout     Hyperlipidemia     Hypertension     Liver cirrhosis     Low back pain     Myocardial infarction     Peripheral neuropathy     Thrombocytopenia        Allergies   Allergen Reactions    Penicillins Unknown - High Severity       Past Surgical History:   Procedure Laterality Date    CARDIAC CATHETERIZATION N/A 06/06/2018    Procedure: Left Heart Cath;  Surgeon: Jax Chappell MD;  Location:  [facility];  Service: Cardiovascular    CARPAL TUNNEL RELEASE Bilateral     Dr. Juan    CHOLECYSTECTOMY      LAMINECTOMY  02/18/2023    C1 and partial C2 laminectomy    WA RT/LT HEART CATHETERS N/A 06/06/2018    Procedure: Percutaneous Coronary Intervention;  Surgeon: Jax Chappell MD;  Location:  [facility];  Service: Cardiovascular    TONSILLECTOMY         Family History   Problem Relation Age of Onset    Heart disease Mother     Cancer Mother     Cancer Father        Social History     Socioeconomic History    Marital status:    Tobacco Use    Smoking status: Never    Smokeless tobacco: Never   Substance and Sexual Activity    Alcohol use: Yes     Comment: Rare    Drug use: No    Sexual activity: Defer           Objective   Physical Exam  Vitals and nursing note reviewed.   Constitutional:       General: He is not in acute  distress.     Appearance: Normal appearance. He is normal weight. He is not ill-appearing, toxic-appearing or diaphoretic.   HENT:      Head: Normocephalic and atraumatic.      Jaw: Tenderness and swelling present.      Comments: SWELLING UNDER CHIN, MODERATE RASH        Right Ear: Tympanic membrane, ear canal and external ear normal. There is no impacted cerumen.      Left Ear: Tympanic membrane, ear canal and external ear normal. There is no impacted cerumen.      Nose: Nose normal. No congestion or rhinorrhea.      Mouth/Throat:      Mouth: Mucous membranes are moist. Mucous membranes are dry.      Pharynx: Oropharynx is clear. No oropharyngeal exudate or posterior oropharyngeal erythema.   Eyes:      General: No scleral icterus.        Right eye: No discharge.         Left eye: No discharge.      Extraocular Movements: Extraocular movements intact.      Conjunctiva/sclera: Conjunctivae normal.      Pupils: Pupils are equal, round, and reactive to light.   Cardiovascular:      Rate and Rhythm: Normal rate and regular rhythm.      Pulses: Normal pulses.      Heart sounds: Normal heart sounds. No murmur heard.     No friction rub. No gallop.   Pulmonary:      Effort: Pulmonary effort is normal. No respiratory distress.      Breath sounds: Normal breath sounds. No stridor. No wheezing, rhonchi or rales.   Chest:      Chest wall: No tenderness.   Abdominal:      General: Abdomen is flat. Bowel sounds are normal. There is no distension.      Palpations: Abdomen is soft. There is no mass.      Tenderness: There is no abdominal tenderness. There is no right CVA tenderness, guarding or rebound.      Hernia: No hernia is present.   Musculoskeletal:         General: No swelling, tenderness, deformity or signs of injury. Normal range of motion.      Cervical back: Normal range of motion and neck supple. No rigidity or tenderness.      Right lower leg: No edema.      Left lower leg: No edema.   Lymphadenopathy:       Cervical: No cervical adenopathy.   Skin:     General: Skin is warm and dry.      Capillary Refill: Capillary refill takes less than 2 seconds.      Coloration: Skin is not jaundiced or pale.      Findings: No bruising, erythema, lesion or rash.   Neurological:      General: No focal deficit present.      Mental Status: He is alert and oriented to person, place, and time. Mental status is at baseline.      Cranial Nerves: No cranial nerve deficit.      Sensory: No sensory deficit.      Motor: No weakness.      Coordination: Coordination normal.      Gait: Gait normal.      Deep Tendon Reflexes: Reflexes normal.   Psychiatric:         Mood and Affect: Mood normal.         Behavior: Behavior normal.         Thought Content: Thought content normal.         Judgment: Judgment normal.         Procedures           ED Course  ED Course as of 02/23/24 1950 Fri Feb 23, 2024   1017 IMPRESSION:  No acute process.   []   1018   IMPRESSION:  Degenerative change with no acute osseous abnormality.   [BH]   1018 IMPRESSION:  No acute intracranial process.   []   1330    IMPRESSION:  No evidence of acute intra-abdominal process.      []   1406 Discussed care with neurosurgery at [facility] [place].  Advised to transfer.  Hospitalist team will call me back. []   1428  Ma []      ED Course User Index  [] Wei Powell PA-C                                             Medical Decision Making  Problems Addressed:  Altered mental status, unspecified altered mental status type: complicated acute illness or injury  Anemia, unspecified type: complicated acute illness or injury  Post-op pain: complicated acute illness or injury    Amount and/or Complexity of Data Reviewed  Labs: ordered.  Radiology: ordered.  ECG/medicine tests: ordered.    Risk  OTC drugs.  Prescription drug management.        Final diagnoses:   Altered mental status, unspecified altered mental status type   Anemia, unspecified type   Post-op pain        ED Disposition  ED Disposition       ED Disposition   Transfer to Another Facility     Condition   --    Comment   --               No follow-up provider specified.       Medication List      No changes were made to your prescriptions during this visit.            Wei Powell PA-C  02/23/24 1950

## 2024-02-23 NOTE — ED NOTES
Pt wife requesting air transport due to increased pain from spinal surgery and requesting to not be transported by EMS. Provider made aware.

## 2024-02-25 LAB
QT INTERVAL: 390 MS
QTC INTERVAL: 497 MS

## 2024-02-28 LAB
BACTERIA SPEC AEROBE CULT: NORMAL
BACTERIA SPEC AEROBE CULT: NORMAL

## 2024-04-03 ENCOUNTER — OFFICE VISIT (OUTPATIENT)
Dept: CARDIOLOGY | Facility: CLINIC | Age: 69
End: 2024-04-03
Payer: MEDICARE

## 2024-04-03 VITALS
WEIGHT: 239 LBS | OXYGEN SATURATION: 96 % | SYSTOLIC BLOOD PRESSURE: 127 MMHG | HEART RATE: 74 BPM | BODY MASS INDEX: 30.67 KG/M2 | DIASTOLIC BLOOD PRESSURE: 71 MMHG | HEIGHT: 74 IN

## 2024-04-03 DIAGNOSIS — I25.10 ASCVD (ARTERIOSCLEROTIC CARDIOVASCULAR DISEASE): Primary | ICD-10-CM

## 2024-04-03 DIAGNOSIS — K92.2 GASTROINTESTINAL HEMORRHAGE, UNSPECIFIED GASTROINTESTINAL HEMORRHAGE TYPE: ICD-10-CM

## 2024-04-03 DIAGNOSIS — I10 ESSENTIAL HYPERTENSION: ICD-10-CM

## 2024-04-03 DIAGNOSIS — E78.5 DYSLIPIDEMIA: ICD-10-CM

## 2024-04-03 PROCEDURE — 3078F DIAST BP <80 MM HG: CPT | Performed by: NURSE PRACTITIONER

## 2024-04-03 PROCEDURE — 3074F SYST BP LT 130 MM HG: CPT | Performed by: NURSE PRACTITIONER

## 2024-04-03 PROCEDURE — 99213 OFFICE O/P EST LOW 20 MIN: CPT | Performed by: NURSE PRACTITIONER

## 2024-04-03 PROCEDURE — 1159F MED LIST DOCD IN RCRD: CPT | Performed by: NURSE PRACTITIONER

## 2024-04-03 PROCEDURE — 1160F RVW MEDS BY RX/DR IN RCRD: CPT | Performed by: NURSE PRACTITIONER

## 2024-04-03 RX ORDER — ALLOPURINOL 300 MG/1
300 TABLET ORAL DAILY
COMMUNITY

## 2024-04-03 RX ORDER — METOPROLOL SUCCINATE 50 MG/1
50 TABLET, EXTENDED RELEASE ORAL DAILY
COMMUNITY

## 2024-04-03 RX ORDER — NITROGLYCERIN 0.4 MG/1
0.4 TABLET SUBLINGUAL
COMMUNITY

## 2024-04-03 NOTE — PROGRESS NOTES
Fish Delaney MD  Adalberto Llamas  1955 04/03/2024    Patient Active Problem List   Diagnosis    Liver cirrhosis    Thrombocytopenia    B12 deficiency    Degenerative disc disease, cervical    Degenerative disc disease, lumbar    Spinal stenosis in cervical region    Chest pain    Other chest pain    ASCVD (arteriosclerotic cardiovascular disease), status post stenting of the LAD June 5, 2018, clinically stable.    Dyslipidemia    Essential hypertension    GERD (gastroesophageal reflux disease)    Dyspnea on exertion       Dear Fish Delaney MD:    Subjective     Chief Complaint   Patient presents with    Med Management     List.            History of Present Illness:    Adalberto Llamas is a 68 y.o. male with a past medical history of ASCVD status post stenting of the diagonal branch of LAD and 2018, hypertension, and dyslipidemia. He presents today for cardiology follow-up.  Reports he has been doing well from a cardiac standpoint.  Did undergo lumbar spine surgery since his last visit here.  Denies any cardiac complications.  He did apparently have an acute GI bleed.  Aspirin was held.  EGD did show peptic ulcer.  He reports he is doing much better.  Denies any GI bleeding recently.  He is scheduled to undergo EGD tomorrow.  He denies any chest pain, shortness of breath, palpitations, dizziness, or lightheadedness.          Allergies   Allergen Reactions    Penicillins Unknown - High Severity   :      Current Outpatient Medications:     allopurinol (ZYLOPRIM) 300 MG tablet, Take 1 tablet by mouth Daily., Disp: , Rfl:     amLODIPine (NORVASC) 5 MG tablet, Take 1 tablet by mouth Daily., Disp: , Rfl:     aspirin 81 MG EC tablet, Take 1 tablet by mouth Daily., Disp: 30 tablet, Rfl: 5    cyclobenzaprine (FLEXERIL) 10 MG tablet, Take 1 tablet by mouth 3 (Three) Times a Day As Needed for Muscle Spasms., Disp: , Rfl:     DULoxetine (CYMBALTA) 30 MG capsule, Take 1 capsule by mouth Daily., Disp: , Rfl:      finasteride (PROSCAR) 5 MG tablet, Take 1 tablet by mouth Daily., Disp: , Rfl:     glimepiride (AMARYL) 2 MG tablet, Take 1 tablet by mouth Every Night., Disp: , Rfl:     HYDROcodone-acetaminophen (NORCO)  MG per tablet, Take 1 tablet by mouth 3 (Three) Times a Day As Needed for Mild Pain., Disp: , Rfl:     hydrOXYzine (ATARAX) 10 MG tablet, Take 1 tablet by mouth 3 (Three) Times a Day As Needed for Itching., Disp: , Rfl:     ibuprofen (ADVIL,MOTRIN) 600 MG tablet, Take 1 tablet by mouth Every 6 (Six) Hours As Needed for Mild Pain., Disp: , Rfl:     icosapent ethyl (Vascepa) 1 g capsule capsule, Take 2 g by mouth 2 (Two) Times a Day With Meals., Disp: , Rfl:     lisinopril-hydrochlorothiazide (PRINZIDE,ZESTORETIC) 20-12.5 MG per tablet, Take 1 tablet by mouth 2 (Two) Times a Day., Disp: 60 tablet, Rfl: 5    meloxicam (MOBIC) 15 MG tablet, Take 1 tablet by mouth Daily., Disp: , Rfl:     metFORMIN (GLUCOPHAGE) 1000 MG tablet, Take 1 tablet by mouth 2 (Two) Times a Day With Meals., Disp: , Rfl:     metoprolol succinate XL (TOPROL-XL) 50 MG 24 hr tablet, Take 1 tablet by mouth Daily., Disp: , Rfl:     nitroglycerin (NITROSTAT) 0.4 MG SL tablet, Place 1 tablet under the tongue Every 5 (Five) Minutes As Needed for Chest Pain. Take no more than 3 doses in 15 minutes., Disp: , Rfl:     omeprazole (priLOSEC) 40 MG capsule, Take 1 capsule by mouth Daily., Disp: , Rfl:     ondansetron (ZOFRAN) 4 MG tablet, Take 1 tablet by mouth Every 8 (Eight) Hours As Needed for Nausea or Vomiting., Disp: , Rfl:     rosuvastatin (CRESTOR) 20 MG tablet, Take 1 tablet by mouth Daily., Disp: , Rfl:     Semaglutide (Rybelsus) 14 MG tablet, Take  by mouth., Disp: , Rfl:     senna 8.6 MG tablet, Take 1 tablet by mouth Daily., Disp: , Rfl:     tamsulosin (FLOMAX) 0.4 MG capsule 24 hr capsule, Take 1 capsule by mouth Every Night., Disp: , Rfl:       The following portions of the patient's history were reviewed and updated as appropriate:  "allergies, current medications, past family history, past medical history, past social history, past surgical history and problem list.    Social History     Tobacco Use    Smoking status: Never    Smokeless tobacco: Never   Vaping Use    Vaping status: Never Used   Substance Use Topics    Alcohol use: Yes     Comment: Rare    Drug use: No       Review of Systems   Constitutional: Negative for decreased appetite and malaise/fatigue.   Cardiovascular:  Negative for chest pain, dyspnea on exertion and palpitations.   Respiratory:  Negative for cough and shortness of breath.        Objective   Vitals:    04/03/24 0847   BP: 127/71   BP Location: Right arm   Patient Position: Sitting   Cuff Size: Adult   Pulse: 74   SpO2: 96%   Weight: 108 kg (239 lb)   Height: 188 cm (74\")     Body mass index is 30.69 kg/m².        Vitals reviewed.   Constitutional:       Appearance: Healthy appearance. Well-developed and not in distress.   HENT:      Head: Normocephalic and atraumatic.   Pulmonary:      Effort: Pulmonary effort is normal.      Breath sounds: Normal breath sounds. No wheezing. No rales.   Cardiovascular:      Normal rate. Regular rhythm.      Murmurs: There is no murmur.      . No S3 and S4 gallop.   Edema:     Peripheral edema absent.   Abdominal:      General: Bowel sounds are normal.      Palpations: Abdomen is soft.   Skin:     General: Skin is warm and dry.   Neurological:      Mental Status: Alert, oriented to person, place, and time and oriented to person, place and time.   Psychiatric:         Mood and Affect: Mood normal.         Behavior: Behavior normal.         Lab Results   Component Value Date     02/23/2024    K 4.8 02/23/2024     02/23/2024    CO2 24.3 02/23/2024    BUN 28 (H) 02/23/2024    CREATININE 0.79 02/23/2024    GLUCOSE 338 (H) 02/23/2024    CALCIUM 9.6 02/23/2024    AST 41 (H) 02/23/2024    ALT 60 (H) 02/23/2024    ALKPHOS 139 (H) 02/23/2024    LABIL2 1.6 04/23/2016     Lab Results "   Component Value Date    WBC 15.50 (H) 02/23/2024    HGB 8.4 (L) 02/23/2024    HCT 26.3 (L) 02/23/2024     02/23/2024     Lab Results   Component Value Date    TSH 0.744 06/05/2018    PSA 0.310 06/14/2018    CHLPL 100 05/05/2014    TRIG 432 (H) 06/05/2018    HDL 28 (L) 06/05/2018    LDL  06/05/2018      Comment:      Unable to calculate          Results for orders placed during the hospital encounter of 10/14/22    Adult Transthoracic Echo Complete W/ Cont if Necessary Per Protocol    Interpretation Summary    Normal left ventricular cavity size and wall thickness noted. All left ventricular wall segments contract normally.    Left ventricular ejection fraction appears to be 56 - 60%.    The aortic valve is abnormal in structure. The aortic valve exhibits sclerosis. There is calcification of the aortic valve. The aortic valve was poorly visualized but appears trileaflet. Mild aortic valve regurgitation is present. Mild aortic valve stenosis is present.    Mild mitral annular calcification is present. Mild mitral valve regurgitation is present. No significant mitral valve stenosis is present.    There is no evidence of pericardial effusion     Results for orders placed during the hospital encounter of 10/14/22    Stress Test With Myocardial Perfusion One Day    Interpretation Summary    A pharmacological stress test was performed using regadenoson without low-level exercise.    Findings consistent with an indeterminate ECG stress test.    Myocardial perfusion imaging indicates a small-to-medium-sized infarct located in the inferior wall with no significant ischemia noted.    Abnormal LV wall motion consistent with mild hypokinesis of the inferior wall.    Left ventricular ejection fraction is normal.  (Calculated EF = 52%).    Impressions are consistent with an intermediate risk study.       Results for orders placed during the hospital encounter of 06/04/18    Cardiac Catheterization/Vascular  Study    Narrative  Patient Name: Adalberto Llamas                  MRN: [id number]    Procedure Date: 6/6/2018    HPI: Patient is a pleasant 63 y.o. male with history of abnormal stress and chest pain. Patient presented with chest pain. Patient was evaluated and recommended to proceed with diagnostic cardiac catheterization with possible need for intervention. All risks, benefits and alternatives were discussed with patient in detail. All his questions and his family's questions were answered to their satisfaction. They all understood and wished to proceed, and therefore the procedure was performed.    Pre-operative Diagnosis: CAD      Procedure Performed:  PCI KEVIN Diagonal  Selective coronary angiography.    Technique: The patient was brought to the cardiac catheterization laboratory after informed consent was obtained. right radial artery area was prepped, draped, anesthestized in the usual manner. The radial artery was entered ailyn a Seldinger technique. A 6-Mongolian sheath over the wire was introduced into the radial artery. This was followed by the use of a 6 -Mongolian tig diagnostic catheter to perform the diagnostic cardiac catheterization. Patient tolerated the procedure well, was hemodynamically stable throughout the procedure. Radial cocktail was administered via the sheath side arm at the time of access.    At the end of the procedure sheath was removed, wrist band was placed. Excellent hemostasis was achieved. Patient transferred to post cath holding area in stable condition.    Findings:    Coronary anatomy:    The left main coronary artery bifurcated into the LAD and left circumflex coronary.  The LAD coursed in the anterior interventricular groove, gave rise to diagonal branches and reached the apex.    Left circumflex coursed in the left atrial ventricular groove and gives rise to several marginal branches.    The right coronary artery course in the right atrial ventricular groove I gave rise to several  acute marginal branches.    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 A properly fitting 6 Khmer guiding catheter was advanced to the coronary artery and 0.014 BMW coronary guidewire was utilized to cross the lesion. PCI was performed with balloon angioplasty followed by drug eluting stent placement. The critical lesion was treated and post-procedural angiography revealed 0% residual stenosis and JULEE III flow. No complications were noted and all equipment removed.      Impression:    Single vessel CAD  90% diagonal lesion  Successful PCI KEVIN D1      Post-operative Diagnosis:  Significant single vessel CAD    Jax Chappell MD        Procedures      Assessment & Plan    Diagnosis Plan   1. ASCVD (arteriosclerotic cardiovascular disease)        2. Essential hypertension        3. Dyslipidemia        4. Gastrointestinal hemorrhage, unspecified gastrointestinal hemorrhage type                     Recommendations:    ASCVD-continue lisinopril, Toprol, and rosuvastatin.  No recent anginal symptoms.  He will resume low-dose aspirin when okay with GI.  Essential hypertension-BP well-controlled.  Dyslipidemia-continue statin therapy.  LDL goal less than 55.  Follow-up in 6 months or sooner if needed.        Return in about 6 months (around 10/3/2024) for Recheck.    As always, I appreciate very much the opportunity to participate in the cardiovascular care of your patients.      With Best Regards,    CHRISTY Ortiz

## 2024-07-30 ENCOUNTER — PATIENT OUTREACH (OUTPATIENT)
Dept: CASE MANAGEMENT | Facility: OTHER | Age: 69
End: 2024-07-30
Payer: MEDICARE

## 2024-07-30 NOTE — OUTREACH NOTE
AMBULATORY CASE MANAGEMENT NOTE    Names and Relationships of Patient/Support Persons: Contact: DEVAN HAINES; Relationship: Emergency Contact -     Patient Outreach    Pt's spouse returned ACM's call from earlier today, states pt will not be home until after 5:30 but she will have him call ACM back tomorrow. ACM provided information on HRCM program and hrs of availability; understanding voiced, ACM will await patient call tomorrow.         Gwen CHOU  Ambulatory Case Management    7/30/2024, 15:50 EDT

## 2024-10-03 ENCOUNTER — OFFICE VISIT (OUTPATIENT)
Dept: CARDIOLOGY | Facility: CLINIC | Age: 69
End: 2024-10-03
Payer: MEDICARE

## 2024-10-03 VITALS
OXYGEN SATURATION: 96 % | BODY MASS INDEX: 30.34 KG/M2 | HEIGHT: 74 IN | WEIGHT: 236.4 LBS | SYSTOLIC BLOOD PRESSURE: 128 MMHG | RESPIRATION RATE: 18 BRPM | HEART RATE: 74 BPM | DIASTOLIC BLOOD PRESSURE: 67 MMHG

## 2024-10-03 DIAGNOSIS — I25.10 ASCVD (ARTERIOSCLEROTIC CARDIOVASCULAR DISEASE): Primary | ICD-10-CM

## 2024-10-03 DIAGNOSIS — I10 ESSENTIAL HYPERTENSION: ICD-10-CM

## 2024-10-03 DIAGNOSIS — E78.5 DYSLIPIDEMIA: ICD-10-CM

## 2024-10-03 PROCEDURE — 93000 ELECTROCARDIOGRAM COMPLETE: CPT | Performed by: NURSE PRACTITIONER

## 2024-10-03 PROCEDURE — 1160F RVW MEDS BY RX/DR IN RCRD: CPT | Performed by: NURSE PRACTITIONER

## 2024-10-03 PROCEDURE — 3074F SYST BP LT 130 MM HG: CPT | Performed by: NURSE PRACTITIONER

## 2024-10-03 PROCEDURE — 3078F DIAST BP <80 MM HG: CPT | Performed by: NURSE PRACTITIONER

## 2024-10-03 PROCEDURE — 1159F MED LIST DOCD IN RCRD: CPT | Performed by: NURSE PRACTITIONER

## 2024-10-03 PROCEDURE — 99214 OFFICE O/P EST MOD 30 MIN: CPT | Performed by: NURSE PRACTITIONER

## 2024-10-03 RX ORDER — ATORVASTATIN CALCIUM 40 MG/1
1 TABLET, FILM COATED ORAL DAILY
COMMUNITY

## 2024-10-03 NOTE — PROGRESS NOTES
Fish Delaney MD  Adalberto Llamas  1955  10/03/2024    Patient Active Problem List   Diagnosis    Liver cirrhosis    Thrombocytopenia    B12 deficiency    Degenerative disc disease, cervical    Degenerative disc disease, lumbar    Spinal stenosis in cervical region    Chest pain    Other chest pain    ASCVD (arteriosclerotic cardiovascular disease), status post stenting of the LAD June 5, 2018, clinically stable.    Dyslipidemia    Essential hypertension    GERD (gastroesophageal reflux disease)    Dyspnea on exertion       Dear Fish Delaney MD:    Subjective     Chief Complaint   Patient presents with    ASCVD     6 month routine         History of Present Illness:    Adalberto Llamas is a 69 y.o. male with a past medical history of GI bleed, ASCVD status post stenting of the diagonal branch of LAD and 2018, hypertension, and dyslipidemia. He presents today for cardiology follow-up. The patient denies chest pain, shortness of breath, palpitations, dizziness, lightheadedness, near syncope, and syncope. His BP has been well controlled. Last LDL was 60.          Allergies   Allergen Reactions    Penicillins Unknown - High Severity   :      Current Outpatient Medications:     allopurinol (ZYLOPRIM) 300 MG tablet, Take 1 tablet by mouth Daily., Disp: , Rfl:     aspirin 81 MG EC tablet, Take 1 tablet by mouth Daily., Disp: 30 tablet, Rfl: 5    atorvastatin (Lipitor) 40 MG tablet, Take 1 tablet by mouth Daily., Disp: , Rfl:     glimepiride (AMARYL) 2 MG tablet, Take 1 tablet by mouth Every Night., Disp: , Rfl:     HYDROcodone-acetaminophen (NORCO)  MG per tablet, Take 1 tablet by mouth 3 (Three) Times a Day As Needed for Mild Pain., Disp: , Rfl:     metFORMIN (GLUCOPHAGE) 1000 MG tablet, Take 1 tablet by mouth 2 (Two) Times a Day With Meals., Disp: , Rfl:     metoprolol succinate XL (TOPROL-XL) 50 MG 24 hr tablet, Take 1 tablet by mouth Daily., Disp: , Rfl:     omeprazole (priLOSEC) 40 MG capsule, Take 1  "capsule by mouth Daily., Disp: , Rfl:     tamsulosin (FLOMAX) 0.4 MG capsule 24 hr capsule, Take 1 capsule by mouth Every Night., Disp: , Rfl:     nitroglycerin (NITROSTAT) 0.4 MG SL tablet, Place 1 tablet under the tongue Every 5 (Five) Minutes As Needed for Chest Pain. Take no more than 3 doses in 15 minutes. (Patient not taking: Reported on 10/3/2024), Disp: , Rfl:       The following portions of the patient's history were reviewed and updated as appropriate: allergies, current medications, past family history, past medical history, past social history, past surgical history and problem list.    Social History     Tobacco Use    Smoking status: Former     Current packs/day: 0.00     Average packs/day: 1.5 packs/day for 24.2 years (36.2 ttl pk-yrs)     Types: Cigarettes     Start date: 1970     Quit date: 10/14/1994     Years since quittin.9    Smokeless tobacco: Never   Vaping Use    Vaping status: Never Used   Substance Use Topics    Alcohol use: Not Currently     Comment: Rare    Drug use: Never       Review of Systems   Constitutional: Negative for decreased appetite and malaise/fatigue.   Cardiovascular:  Negative for chest pain, dyspnea on exertion and palpitations.   Respiratory:  Negative for cough and shortness of breath.        Objective   Vitals:    10/03/24 0847   BP: 128/67   BP Location: Left arm   Patient Position: Sitting   Cuff Size: Adult   Pulse: 74   Resp: 18   SpO2: 96%   Weight: 107 kg (236 lb 6.4 oz)   Height: 188 cm (74\")     Body mass index is 30.35 kg/m².        Vitals reviewed.   Constitutional:       Appearance: Healthy appearance. Well-developed and not in distress.   HENT:      Head: Normocephalic and atraumatic.   Pulmonary:      Effort: Pulmonary effort is normal.      Breath sounds: Normal breath sounds. No wheezing. No rales.   Cardiovascular:      Normal rate. Regular rhythm.      Murmurs: There is no murmur.      . No S3 and S4 gallop.   Edema:     Peripheral edema " absent.   Abdominal:      General: Bowel sounds are normal.      Palpations: Abdomen is soft.   Skin:     General: Skin is warm and dry.   Neurological:      Mental Status: Alert, oriented to person, place, and time and oriented to person, place and time.   Psychiatric:         Mood and Affect: Mood normal.         Behavior: Behavior normal.         Lab Results   Component Value Date     02/23/2024    K 4.8 02/23/2024     02/23/2024    CO2 24.3 02/23/2024    BUN 28 (H) 02/23/2024    CREATININE 0.79 02/23/2024    GLUCOSE 338 (H) 02/23/2024    CALCIUM 9.6 02/23/2024    AST 41 (H) 02/23/2024    ALT 60 (H) 02/23/2024    ALKPHOS 139 (H) 02/23/2024    LABIL2 1.6 04/23/2016     Lab Results   Component Value Date    WBC 8.4 10/02/2024    HGB 13.6 10/02/2024    HCT 41.8 10/02/2024     10/02/2024     Lab Results   Component Value Date    TSH 0.744 06/05/2018    PSA 0.310 06/14/2018    CHLPL 100 05/05/2014    TRIG 432 (H) 06/05/2018    HDL 28 (L) 06/05/2018    LDL  06/05/2018      Comment:      Unable to calculate          Results for orders placed during the hospital encounter of 10/14/22    Adult Transthoracic Echo Complete W/ Cont if Necessary Per Protocol    Interpretation Summary    Normal left ventricular cavity size and wall thickness noted. All left ventricular wall segments contract normally.    Left ventricular ejection fraction appears to be 56 - 60%.    The aortic valve is abnormal in structure. The aortic valve exhibits sclerosis. There is calcification of the aortic valve. The aortic valve was poorly visualized but appears trileaflet. Mild aortic valve regurgitation is present. Mild aortic valve stenosis is present.    Mild mitral annular calcification is present. Mild mitral valve regurgitation is present. No significant mitral valve stenosis is present.    There is no evidence of pericardial effusion     Results for orders placed during the hospital encounter of 10/14/22    Stress Test With  Myocardial Perfusion One Day    Interpretation Summary    A pharmacological stress test was performed using regadenoson without low-level exercise.    Findings consistent with an indeterminate ECG stress test.    Myocardial perfusion imaging indicates a small-to-medium-sized infarct located in the inferior wall with no significant ischemia noted.    Abnormal LV wall motion consistent with mild hypokinesis of the inferior wall.    Left ventricular ejection fraction is normal.  (Calculated EF = 52%).    Impressions are consistent with an intermediate risk study.       Results for orders placed during the hospital encounter of 06/04/18    Cardiac Catheterization/Vascular Study    Narrative  Patient Name: Adalberto Llamas                  MRN: [id number]    Procedure Date: 6/6/2018    HPI: Patient is a pleasant 63 y.o. male with history of abnormal stress and chest pain. Patient presented with chest pain. Patient was evaluated and recommended to proceed with diagnostic cardiac catheterization with possible need for intervention. All risks, benefits and alternatives were discussed with patient in detail. All his questions and his family's questions were answered to their satisfaction. They all understood and wished to proceed, and therefore the procedure was performed.    Pre-operative Diagnosis: CAD      Procedure Performed:  PCI KEVIN Diagonal  Selective coronary angiography.    Technique: The patient was brought to the cardiac catheterization laboratory after informed consent was obtained. right radial artery area was prepped, draped, anesthestized in the usual manner. The radial artery was entered ailyn a Seldinger technique. A 6-Estonian sheath over the wire was introduced into the radial artery. This was followed by the use of a 6 -Estonian tig diagnostic catheter to perform the diagnostic cardiac catheterization. Patient tolerated the procedure well, was hemodynamically stable throughout the procedure. Radial cocktail was  administered via the sheath side arm at the time of access.    At the end of the procedure sheath was removed, wrist band was placed. Excellent hemostasis was achieved. Patient transferred to post cath holding area in stable condition.    Findings:    Coronary anatomy:    The left main coronary artery bifurcated into the LAD and left circumflex coronary.  The LAD coursed in the anterior interventricular groove, gave rise to diagonal branches and reached the apex.    Left circumflex coursed in the left atrial ventricular groove and gives rise to several marginal branches.    The right coronary artery course in the right atrial ventricular groove I gave rise to several acute marginal branches.    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 A properly fitting 6 Czech guiding catheter was advanced to the coronary artery and 0.014 BMW coronary guidewire was utilized to cross the lesion. PCI was performed with balloon angioplasty followed by drug eluting stent placement. The critical lesion was treated and post-procedural angiography revealed 0% residual stenosis and JULEE III flow. No complications were noted and all equipment removed.      Impression:    Single vessel CAD  90% diagonal lesion  Successful PCI KEVIN D1      Post-operative Diagnosis:  Significant single vessel CAD    Jax Chappell MD          ECG 12 Lead    Date/Time: 10/3/2024 8:49 AM  Performed by: Farzana House APRN    Authorized by: Farzana House APRN  Comparison: compared with previous ECG from 2/23/2024  Similar to previous ECG  Rhythm: sinus rhythm  BPM: 73  Conduction: left bundle branch block        Assessment  & Plan    Diagnosis Plan   1. ASCVD (arteriosclerotic cardiovascular disease)  ECG 12 Lead      2. Essential hypertension  ECG 12 Lead      3. Dyslipidemia  ECG 12 Lead               Recommendations:  ASCVD - continue low dose aspirin, atorvastatin, and metoprolol. No recent anginal symptoms.   Essential Hypertension - BP well controlled. Continue metoprolol.  Dyslipidemia - LDL at goal. Continue atorvastatin.  Follow up in 6 months or sooner if needed.       Return in about 6 months (around 4/3/2025) for Recheck.    As always, I appreciate very much the opportunity to participate in the cardiovascular care of your patients.      With Best Regards,    CHRISTY Ortiz

## 2025-04-10 ENCOUNTER — OFFICE VISIT (OUTPATIENT)
Dept: CARDIOLOGY | Facility: CLINIC | Age: 70
End: 2025-04-10
Payer: MEDICARE

## 2025-04-10 VITALS
HEIGHT: 74 IN | SYSTOLIC BLOOD PRESSURE: 127 MMHG | DIASTOLIC BLOOD PRESSURE: 65 MMHG | OXYGEN SATURATION: 95 % | RESPIRATION RATE: 18 BRPM | BODY MASS INDEX: 32.08 KG/M2 | WEIGHT: 250 LBS | HEART RATE: 65 BPM

## 2025-04-10 DIAGNOSIS — E78.5 DYSLIPIDEMIA: ICD-10-CM

## 2025-04-10 DIAGNOSIS — I10 ESSENTIAL HYPERTENSION: ICD-10-CM

## 2025-04-10 DIAGNOSIS — I25.10 ASCVD (ARTERIOSCLEROTIC CARDIOVASCULAR DISEASE): Primary | ICD-10-CM

## 2025-04-10 PROCEDURE — 99214 OFFICE O/P EST MOD 30 MIN: CPT | Performed by: NURSE PRACTITIONER

## 2025-04-10 PROCEDURE — 3078F DIAST BP <80 MM HG: CPT | Performed by: NURSE PRACTITIONER

## 2025-04-10 PROCEDURE — 3074F SYST BP LT 130 MM HG: CPT | Performed by: NURSE PRACTITIONER

## 2025-04-10 PROCEDURE — 1160F RVW MEDS BY RX/DR IN RCRD: CPT | Performed by: NURSE PRACTITIONER

## 2025-04-10 PROCEDURE — 93000 ELECTROCARDIOGRAM COMPLETE: CPT | Performed by: NURSE PRACTITIONER

## 2025-04-10 PROCEDURE — 1159F MED LIST DOCD IN RCRD: CPT | Performed by: NURSE PRACTITIONER

## 2025-04-10 RX ORDER — ROSUVASTATIN CALCIUM 20 MG/1
20 TABLET, COATED ORAL DAILY
COMMUNITY

## 2025-04-10 RX ORDER — PANTOPRAZOLE SODIUM 40 MG/1
40 TABLET, DELAYED RELEASE ORAL DAILY
COMMUNITY

## 2025-04-10 RX ORDER — SILDENAFIL 100 MG/1
100 TABLET, FILM COATED ORAL DAILY PRN
COMMUNITY
Start: 2024-12-10 | End: 2025-04-10 | Stop reason: ALTCHOICE

## 2025-04-10 RX ORDER — ERGOCALCIFEROL 1.25 MG/1
1 CAPSULE, LIQUID FILLED ORAL 2 TIMES WEEKLY
COMMUNITY
Start: 2025-03-20

## 2025-04-10 RX ORDER — AMLODIPINE BESYLATE 5 MG/1
5 TABLET ORAL DAILY
COMMUNITY

## 2025-04-10 RX ORDER — FINASTERIDE 5 MG/1
5 TABLET, FILM COATED ORAL DAILY
COMMUNITY

## 2025-04-10 RX ORDER — LISINOPRIL AND HYDROCHLOROTHIAZIDE 12.5; 2 MG/1; MG/1
1 TABLET ORAL DAILY
COMMUNITY

## 2025-04-10 RX ORDER — DULOXETIN HYDROCHLORIDE 30 MG/1
30 CAPSULE, DELAYED RELEASE ORAL DAILY
COMMUNITY

## 2025-04-10 NOTE — PROGRESS NOTES
Fish Delaney MD  Adalberto Llamas  1955  04/10/2025    Patient Active Problem List   Diagnosis    Liver cirrhosis    Thrombocytopenia    B12 deficiency    Degenerative disc disease, cervical    Degenerative disc disease, lumbar    Spinal stenosis in cervical region    Chest pain    Other chest pain    ASCVD (arteriosclerotic cardiovascular disease), status post stenting of the LAD June 5, 2018, clinically stable.    Dyslipidemia    Essential hypertension    GERD (gastroesophageal reflux disease)    Dyspnea on exertion       Dear Fish Delaney MD:    Subjective     Chief Complaint   Patient presents with    Follow-up         History of Present Illness:    Adalberto Llamas is a 69 y.o. male with a past medical history of GI bleed, ASCVD status post stenting of the diagonal branch of LAD and 2018, hypertension, and dyslipidemia. He presents today for cardiology follow-up. He reports he has been doing well from a cardiac standpoint. The patient denies chest pain, shortness of breath, palpitations, dizziness, lightheadedness, near syncope, and syncope. His BP has been well controlled. LDL recently was 44.           Allergies   Allergen Reactions    Penicillins Unknown - High Severity   :      Current Outpatient Medications:     allopurinol (ZYLOPRIM) 300 MG tablet, Take 1 tablet by mouth Daily., Disp: , Rfl:     amLODIPine (NORVASC) 5 MG tablet, Take 1 tablet by mouth Daily., Disp: , Rfl:     aspirin 81 MG EC tablet, Take 1 tablet by mouth Daily., Disp: 30 tablet, Rfl: 5    DULoxetine (CYMBALTA) 30 MG capsule, Take 1 capsule by mouth Daily., Disp: , Rfl:     finasteride (PROSCAR) 5 MG tablet, Take 1 tablet by mouth Daily., Disp: , Rfl:     glimepiride (AMARYL) 2 MG tablet, Take 1 tablet by mouth Every Night., Disp: , Rfl:     HYDROcodone-acetaminophen (NORCO)  MG per tablet, Take 1 tablet by mouth 3 (Three) Times a Day As Needed for Mild Pain., Disp: , Rfl:     lisinopril-hydrochlorothiazide  (PRINZIDE,ZESTORETIC) 20-12.5 MG per tablet, Take 1 tablet by mouth Daily., Disp: , Rfl:     metFORMIN (GLUCOPHAGE) 1000 MG tablet, Take 1 tablet by mouth 2 (Two) Times a Day With Meals., Disp: , Rfl:     metoprolol succinate XL (TOPROL-XL) 50 MG 24 hr tablet, Take 1 tablet by mouth Daily., Disp: , Rfl:     pantoprazole (PROTONIX) 40 MG EC tablet, Take 1 tablet by mouth Daily., Disp: , Rfl:     rosuvastatin (CRESTOR) 20 MG tablet, Take 1 tablet by mouth Daily., Disp: , Rfl:     tamsulosin (FLOMAX) 0.4 MG capsule 24 hr capsule, Take 1 capsule by mouth Every Night., Disp: , Rfl:     vitamin D (ERGOCALCIFEROL) 1.25 MG (53163 UT) capsule capsule, Take 1 capsule by mouth 2 (Two) Times a Week., Disp: , Rfl:     nitroglycerin (NITROSTAT) 0.4 MG SL tablet, Place 1 tablet under the tongue Every 5 (Five) Minutes As Needed for Chest Pain. Take no more than 3 doses in 15 minutes. (Patient not taking: Reported on 4/10/2025), Disp: , Rfl:       The following portions of the patient's history were reviewed and updated as appropriate: allergies, current medications, past family history, past medical history, past social history, past surgical history and problem list.    Social History     Tobacco Use    Smoking status: Former     Current packs/day: 0.00     Average packs/day: 1.5 packs/day for 24.2 years (36.2 ttl pk-yrs)     Types: Cigarettes     Start date: 1970     Quit date: 10/14/1994     Years since quittin.5    Smokeless tobacco: Never   Vaping Use    Vaping status: Never Used   Substance Use Topics    Alcohol use: Not Currently     Comment: Rare    Drug use: Never       Review of Systems   Constitutional: Negative for decreased appetite and malaise/fatigue.   Cardiovascular:  Negative for chest pain, dyspnea on exertion and palpitations.   Respiratory:  Negative for cough and shortness of breath.        Objective   Vitals:    04/10/25 0928   BP: 127/65   BP Location: Left arm   Patient Position: Sitting   Cuff  "Size: Adult   Pulse: 65   Resp: 18   SpO2: 95%   Weight: 113 kg (250 lb)   Height: 188 cm (74.02\")     Body mass index is 32.08 kg/m².        Vitals reviewed.   Constitutional:       Appearance: Healthy appearance. Well-developed and not in distress.   HENT:      Head: Normocephalic and atraumatic.   Pulmonary:      Effort: Pulmonary effort is normal.      Breath sounds: Normal breath sounds. No wheezing. No rales.   Cardiovascular:      Normal rate. Regular rhythm.      Murmurs: There is a grade 1/6 systolic murmur.      . No S3 and S4 gallop.   Edema:     Peripheral edema absent.   Abdominal:      General: Bowel sounds are normal.      Palpations: Abdomen is soft.   Skin:     General: Skin is warm and dry.   Neurological:      Mental Status: Alert, oriented to person, place, and time and oriented to person, place and time.   Psychiatric:         Mood and Affect: Mood normal.         Behavior: Behavior normal.         Lab Results   Component Value Date     02/23/2024    K 4.8 02/23/2024     02/23/2024    CO2 24.3 02/23/2024    BUN 28 (H) 02/23/2024    CREATININE 0.79 02/23/2024    GLUCOSE 338 (H) 02/23/2024    CALCIUM 9.6 02/23/2024    AST 41 (H) 02/23/2024    ALT 60 (H) 02/23/2024    ALKPHOS 139 (H) 02/23/2024     Lab Results   Component Value Date    WBC 6 03/12/2025    HGB 14.9 03/12/2025    HCT 45.3 03/12/2025     03/12/2025     Lab Results   Component Value Date    TSH 0.744 06/05/2018    PSA 0.310 06/14/2018    CHLPL 100 05/05/2014    TRIG 432 (H) 06/05/2018    HDL 28 (L) 06/05/2018    LDL  06/05/2018      Comment:      Unable to calculate          Results for orders placed during the hospital encounter of 10/14/22    Adult Transthoracic Echo Complete W/ Cont if Necessary Per Protocol    Interpretation Summary    Normal left ventricular cavity size and wall thickness noted. All left ventricular wall segments contract normally.    Left ventricular ejection fraction appears to be 56 - 60%.    " The aortic valve is abnormal in structure. The aortic valve exhibits sclerosis. There is calcification of the aortic valve. The aortic valve was poorly visualized but appears trileaflet. Mild aortic valve regurgitation is present. Mild aortic valve stenosis is present.    Mild mitral annular calcification is present. Mild mitral valve regurgitation is present. No significant mitral valve stenosis is present.    There is no evidence of pericardial effusion     Results for orders placed during the hospital encounter of 10/14/22    Stress Test With Myocardial Perfusion One Day    Interpretation Summary    A pharmacological stress test was performed using regadenoson without low-level exercise.    Findings consistent with an indeterminate ECG stress test.    Myocardial perfusion imaging indicates a small-to-medium-sized infarct located in the inferior wall with no significant ischemia noted.    Abnormal LV wall motion consistent with mild hypokinesis of the inferior wall.    Left ventricular ejection fraction is normal.  (Calculated EF = 52%).    Impressions are consistent with an intermediate risk study.       Results for orders placed during the hospital encounter of 06/04/18    Cardiac Catheterization/Vascular Study    Narrative  Patient Name: Adalberto Llamas                  MRN: [id number]    Procedure Date: 6/6/2018    HPI: Patient is a pleasant 63 y.o. male with history of abnormal stress and chest pain. Patient presented with chest pain. Patient was evaluated and recommended to proceed with diagnostic cardiac catheterization with possible need for intervention. All risks, benefits and alternatives were discussed with patient in detail. All his questions and his family's questions were answered to their satisfaction. They all understood and wished to proceed, and therefore the procedure was performed.    Pre-operative Diagnosis: CAD      Procedure Performed:  PCI KEVIN Diagonal  Selective coronary  angiography.    Technique: The patient was brought to the cardiac catheterization laboratory after informed consent was obtained. right radial artery area was prepped, draped, anesthestized in the usual manner. The radial artery was entered ailyn a Seldinger technique. A 6-Somali sheath over the wire was introduced into the radial artery. This was followed by the use of a 6 -Somali tig diagnostic catheter to perform the diagnostic cardiac catheterization. Patient tolerated the procedure well, was hemodynamically stable throughout the procedure. Radial cocktail was administered via the sheath side arm at the time of access.    At the end of the procedure sheath was removed, wrist band was placed. Excellent hemostasis was achieved. Patient transferred to post cath holding area in stable condition.    Findings:    Coronary anatomy:    The left main coronary artery bifurcated into the LAD and left circumflex coronary.  The LAD coursed in the anterior interventricular groove, gave rise to diagonal branches and reached the apex.    Left circumflex coursed in the left atrial ventricular groove and gives rise to several marginal branches.    The right coronary artery course in the right atrial ventricular groove I gave rise to several acute marginal branches.    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 A properly fitting 6 Somali guiding catheter was advanced to the coronary artery and 0.014 BMW coronary guidewire was utilized to cross the lesion. PCI was performed with balloon angioplasty followed by drug eluting stent placement. The critical lesion was treated and post-procedural angiography  revealed 0% residual stenosis and JULEE III flow. No complications were noted and all equipment removed.      Impression:    Single vessel CAD  90% diagonal lesion  Successful PCI KEVIN D1      Post-operative Diagnosis:  Significant single vessel CAD    Jax Chappell MD          ECG 12 Lead    Date/Time: 4/10/2025 9:42 AM  Performed by: Farzana House APRN    Authorized by: Farzana House APRN  Comparison: compared with previous ECG   Similar to previous ECG  Rhythm: sinus rhythm  BPM: 64  Conduction: left bundle branch block          Assessment & Plan    Diagnosis Plan   1. ASCVD (arteriosclerotic cardiovascular disease)  ECG 12 Lead      2. Essential hypertension  ECG 12 Lead      3. Dyslipidemia  ECG 12 Lead               Recommendations:  ASCVD - no recent anginal symptoms. Continue low dose aspirin, metoprolol, and rosuvastatin.  Essential hypertension - BP well controlled. Continue amlodipine.  Dyslipidemia - Continue rosuvastatin, LDL at goal.  Follow up in 6 months or sooner if needed.       Return in about 6 months (around 10/10/2025) for Recheck.    As always, I appreciate very much the opportunity to participate in the cardiovascular care of your patients.      With Best Regards,    CHRISTY Ortiz

## 2025-06-13 ENCOUNTER — TRANSCRIBE ORDERS (OUTPATIENT)
Dept: ADMINISTRATIVE | Facility: HOSPITAL | Age: 70
End: 2025-06-13
Payer: MEDICARE

## 2025-06-13 DIAGNOSIS — R10.84 ABDOMINAL PAIN, GENERALIZED: ICD-10-CM

## 2025-06-13 DIAGNOSIS — D69.6 LOW PLATELET COUNT: Primary | ICD-10-CM

## 2025-06-23 ENCOUNTER — HOSPITAL ENCOUNTER (OUTPATIENT)
Dept: ULTRASOUND IMAGING | Facility: HOSPITAL | Age: 70
Discharge: HOME | End: 2025-06-23
Admitting: INTERNAL MEDICINE
Payer: MEDICARE

## 2025-06-23 DIAGNOSIS — D69.6 LOW PLATELET COUNT: ICD-10-CM

## 2025-06-23 DIAGNOSIS — R10.84 ABDOMINAL PAIN, GENERALIZED: ICD-10-CM

## 2025-06-23 PROCEDURE — 76700 US EXAM ABDOM COMPLETE: CPT

## 2025-06-23 PROCEDURE — 76700 US EXAM ABDOM COMPLETE: CPT | Performed by: RADIOLOGY

## (undated) DEVICE — ST INF PRI SMRTSTE 20DRP 2VLV 24ML 117

## (undated) DEVICE — DRAPE, RADIAL, STERILE: Brand: MEDLINE

## (undated) DEVICE — HI-TORQUE BALANCE MIDDLEWEIGHT GUIDE WIRE W/HYDROCOAT .014 STRAIGHT TIP 3.0 CM X 190 CM: Brand: HI-TORQUE BALANCE MIDDLEWEIGHT

## (undated) DEVICE — NC TREK CORONARY DILATATION CATHETER 3.25 MM X 12 MM / RAPID-EXCHANGE: Brand: NC TREK

## (undated) DEVICE — ADULT DISPOSABLE SINGLE-PATIENT USE PULSE OXIMETER SENSOR: Brand: NONIN

## (undated) DEVICE — TREK CORONARY DILATATION CATHETER 2.50 MM X 15 MM / RAPID-EXCHANGE: Brand: TREK

## (undated) DEVICE — GLIDESHEATH SLENDER ACCESS KIT: Brand: GLIDESHEATH SLENDER

## (undated) DEVICE — DEV INFL MONARCH 25W

## (undated) DEVICE — CANNULA,OXY,ADULT,SUPER SOFT,W/14'TUB,UC: Brand: MEDLINE INDUSTRIES, INC.

## (undated) DEVICE — TR BAND RADIAL ARTERY COMPRESSION DEVICE: Brand: TR BAND

## (undated) DEVICE — GUIDELINER CATHETERS ARE INTENDED TO BE USED IN CONJUNCTION WITH GUIDE CATHETERS TO ACCESS DISCRETE REGIONS OF THE CORONARY AND/OR PERIPHERAL VASCULATURE, AND TO FACILITATE PLACEMENT OF INTERVENTIONAL DEVICES.: Brand: GUIDELINER® V3 CATHETER

## (undated) DEVICE — PK CATH CARD 70

## (undated) DEVICE — ADULT, RADIOTRANSPARENT ELEMENT, COMPATIBLE W/ ZOLL: Brand: DEFIBRILLATION ELECTRODES

## (undated) DEVICE — ST EXT IV SMARTSITE 2VLV SP M LL 5ML IV1

## (undated) DEVICE — DRSNG SURESITE WNDW 4X4.5

## (undated) DEVICE — Device

## (undated) DEVICE — A2000 MULTI-USE SYRINGE KIT, P/N 701277-003KIT CONTENTS: 100ML CONTRAST RESERVOIR AND TUBING WITH CONTRAST SPIKE AND CLAMP: Brand: A2000 MULTI-USE SYRINGE KIT

## (undated) DEVICE — RADIFOCUS OPTITORQUE ANGIOGRAPHIC CATHETER: Brand: OPTITORQUE

## (undated) DEVICE — GW INQW FIX/CORE PTFE J/3MM .035 260CM

## (undated) DEVICE — RUNWAY RADL W/TOP PAD

## (undated) DEVICE — NC TREK CORONARY DILATATION CATHETER 3.0 MM X 12 MM / RAPID-EXCHANGE: Brand: NC TREK

## (undated) DEVICE — MODEL AT P65, P/N 701554-001KIT CONTENTS: HAND CONTROLLER, 3-WAY HIGH-PRESSURE STOPCOCK WITH ROTATING END AND PREMIUM HIGH-PRESSURE TUBING: Brand: ANGIOTOUCH® KIT

## (undated) DEVICE — 6F .070 XB 3.5 100CM: Brand: VISTA BRITE TIP